# Patient Record
Sex: FEMALE | Race: WHITE | NOT HISPANIC OR LATINO | Employment: FULL TIME | URBAN - METROPOLITAN AREA
[De-identification: names, ages, dates, MRNs, and addresses within clinical notes are randomized per-mention and may not be internally consistent; named-entity substitution may affect disease eponyms.]

---

## 2020-01-23 PROBLEM — Z02.89 HEALTH EXAMINATION OF DEFINED SUBPOPULATION: Status: ACTIVE | Noted: 2020-01-23

## 2020-01-23 PROBLEM — Z87.891 HISTORY OF TOBACCO USE: Status: ACTIVE | Noted: 2020-01-23

## 2020-01-23 PROBLEM — E66.9 OBESITY: Status: ACTIVE | Noted: 2020-01-23

## 2020-01-23 PROBLEM — Z86.19 HISTORY OF CHICKEN POX: Status: ACTIVE | Noted: 2020-01-23

## 2020-06-11 ENCOUNTER — TRANSCRIBE ORDERS (OUTPATIENT)
Dept: ADMINISTRATIVE | Facility: HOSPITAL | Age: 55
End: 2020-06-11

## 2020-06-11 DIAGNOSIS — Z13.9 VISIT FOR SCREENING: Primary | ICD-10-CM

## 2020-08-18 ENCOUNTER — HOSPITAL ENCOUNTER (OUTPATIENT)
Dept: GENERAL RADIOLOGY | Facility: HOSPITAL | Age: 55
Discharge: HOME | End: 2020-08-18
Admitting: ORTHOPAEDIC SURGERY

## 2020-08-18 DIAGNOSIS — M25.562 LEFT KNEE PAIN: ICD-10-CM

## 2020-08-18 PROCEDURE — 73562 X-RAY EXAM OF KNEE 3: CPT

## 2020-09-15 ENCOUNTER — HOSPITAL ENCOUNTER (EMERGENCY)
Facility: HOSPITAL | Age: 55
Discharge: HOME | End: 2020-09-16
Admitting: EMERGENCY MEDICINE

## 2020-09-15 ENCOUNTER — APPOINTMENT (OUTPATIENT)
Dept: CT IMAGING | Facility: HOSPITAL | Age: 55
End: 2020-09-15

## 2020-09-15 DIAGNOSIS — S30.0XXA CONTUSION OF LOWER BACK, INITIAL ENCOUNTER: ICD-10-CM

## 2020-09-15 DIAGNOSIS — S70.01XA CONTUSION OF RIGHT HIP, INITIAL ENCOUNTER: ICD-10-CM
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
## 2020-09-15 DIAGNOSIS — R11.0 NAUSEA: ICD-10-CM

## 2020-09-15 LAB
ALBUMIN SERPL-MCNC: 4.6 G/DL (ref 3.5–5.2)
ALBUMIN/GLOB SERPL: 1.9 G/DL
ALP SERPL-CCNC: 65 U/L (ref 39–117)
ALT SERPL W P-5'-P-CCNC: 19 U/L (ref 1–33)
ANION GAP SERPL CALCULATED.3IONS-SCNC: 11 MMOL/L (ref 5–15)
AST SERPL-CCNC: 18 U/L (ref 1–32)
BASOPHILS # BLD AUTO: 0 10*3/MM3 (ref 0–0.2)
BASOPHILS NFR BLD AUTO: 0.4 % (ref 0–1.5)
BILIRUB SERPL-MCNC: 0.6 MG/DL (ref 0–1.2)
BUN SERPL-MCNC: 10 MG/DL (ref 6–20)
BUN SERPL-MCNC: ABNORMAL MG/DL
BUN/CREAT SERPL: ABNORMAL
CALCIUM SPEC-SCNC: 9.4 MG/DL (ref 8.6–10.5)
CHLORIDE SERPL-SCNC: 97 MMOL/L (ref 98–107)
CO2 SERPL-SCNC: 27 MMOL/L (ref 22–29)
CREAT SERPL-MCNC: 0.81 MG/DL (ref 0.57–1)
DEPRECATED RDW RBC AUTO: 39.8 FL (ref 37–54)
EOSINOPHIL # BLD AUTO: 0 10*3/MM3 (ref 0–0.4)
EOSINOPHIL NFR BLD AUTO: 0.4 % (ref 0.3–6.2)
ERYTHROCYTE [DISTWIDTH] IN BLOOD BY AUTOMATED COUNT: 12.9 % (ref 12.3–15.4)
GFR SERPL CREATININE-BSD FRML MDRD: 73 ML/MIN/1.73
GLOBULIN UR ELPH-MCNC: 2.4 GM/DL
GLUCOSE SERPL-MCNC: 142 MG/DL (ref 65–99)
HCT VFR BLD AUTO: 38.3 % (ref 34–46.6)
HGB BLD-MCNC: 13.4 G/DL (ref 12–15.9)
LYMPHOCYTES # BLD AUTO: 1 10*3/MM3 (ref 0.7–3.1)
LYMPHOCYTES NFR BLD AUTO: 14.6 % (ref 19.6–45.3)
MCH RBC QN AUTO: 31.2 PG (ref 26.6–33)
MCHC RBC AUTO-ENTMCNC: 35.1 G/DL (ref 31.5–35.7)
MCV RBC AUTO: 89 FL (ref 79–97)
MONOCYTES # BLD AUTO: 0.2 10*3/MM3 (ref 0.1–0.9)
MONOCYTES NFR BLD AUTO: 3.7 % (ref 5–12)
NEUTROPHILS NFR BLD AUTO: 5.5 10*3/MM3 (ref 1.7–7)
NEUTROPHILS NFR BLD AUTO: 80.9 % (ref 42.7–76)
NRBC BLD AUTO-RTO: 0.1 /100 WBC (ref 0–0.2)
PLATELET # BLD AUTO: 226 10*3/MM3 (ref 140–450)
PMV BLD AUTO: 6.9 FL (ref 6–12)
POTASSIUM SERPL-SCNC: 3.9 MMOL/L (ref 3.5–5.2)
PROT SERPL-MCNC: 7 G/DL (ref 6–8.5)
RBC # BLD AUTO: 4.3 10*6/MM3 (ref 3.77–5.28)
SODIUM SERPL-SCNC: 135 MMOL/L (ref 136–145)
WBC # BLD AUTO: 6.8 10*3/MM3 (ref 3.4–10.8)

## 2020-09-15 PROCEDURE — 96374 THER/PROPH/DIAG INJ IV PUSH: CPT

## 2020-09-15 PROCEDURE — 74177 CT ABD & PELVIS W/CONTRAST: CPT

## 2020-09-15 PROCEDURE — 0 IOPAMIDOL PER 1 ML: Performed by: NURSE PRACTITIONER

## 2020-09-15 PROCEDURE — 96375 TX/PRO/DX INJ NEW DRUG ADDON: CPT

## 2020-09-15 PROCEDURE — 25010000002 MORPHINE PER 10 MG: Performed by: NURSE PRACTITIONER

## 2020-09-15 PROCEDURE — 25010000002 ONDANSETRON PER 1 MG: Performed by: NURSE PRACTITIONER

## 2020-09-15 PROCEDURE — 80053 COMPREHEN METABOLIC PANEL: CPT | Performed by: NURSE PRACTITIONER

## 2020-09-15 PROCEDURE — 99283 EMERGENCY DEPT VISIT LOW MDM: CPT

## 2020-09-15 PROCEDURE — 85025 COMPLETE CBC W/AUTO DIFF WBC: CPT | Performed by: NURSE PRACTITIONER

## 2020-09-15 RX ORDER — ONDANSETRON 2 MG/ML
4 INJECTION INTRAMUSCULAR; INTRAVENOUS ONCE
Status: COMPLETED | OUTPATIENT
Start: 2020-09-15 | End: 2020-09-15

## 2020-09-15 RX ORDER — MORPHINE SULFATE 4 MG/ML
4 INJECTION, SOLUTION INTRAMUSCULAR; INTRAVENOUS ONCE
Status: COMPLETED | OUTPATIENT
Start: 2020-09-15 | End: 2020-09-15

## 2020-09-15 RX ORDER — SODIUM CHLORIDE 0.9 % (FLUSH) 0.9 %
10 SYRINGE (ML) INJECTION AS NEEDED
Status: DISCONTINUED | OUTPATIENT
Start: 2020-09-15 | End: 2020-09-16 | Stop reason: HOSPADM

## 2020-09-15 RX ADMIN — MORPHINE SULFATE 4 MG: 4 INJECTION INTRAVENOUS at 22:43

## 2020-09-15 RX ADMIN — IOPAMIDOL 100 ML: 755 INJECTION, SOLUTION INTRAVENOUS at 23:32

## 2020-09-15 RX ADMIN — ONDANSETRON 4 MG: 2 INJECTION INTRAMUSCULAR; INTRAVENOUS at 22:43

## 2020-09-16 VITALS
BODY MASS INDEX: 34.44 KG/M2 | RESPIRATION RATE: 16 BRPM | HEIGHT: 64 IN | SYSTOLIC BLOOD PRESSURE: 101 MMHG | TEMPERATURE: 97.8 F | WEIGHT: 201.72 LBS | HEART RATE: 64 BPM | DIASTOLIC BLOOD PRESSURE: 56 MMHG | OXYGEN SATURATION: 95 %

## 2020-09-16 PROCEDURE — 25010000002 ONDANSETRON PER 1 MG: Performed by: NURSE PRACTITIONER

## 2020-09-16 PROCEDURE — 96375 TX/PRO/DX INJ NEW DRUG ADDON: CPT

## 2020-09-16 PROCEDURE — 25010000002 HYDROMORPHONE PER 4 MG: Performed by: NURSE PRACTITIONER

## 2020-09-16 PROCEDURE — 96376 TX/PRO/DX INJ SAME DRUG ADON: CPT

## 2020-09-16 RX ORDER — HYDROCODONE BITARTRATE AND ACETAMINOPHEN 7.5; 325 MG/1; MG/1
1-2 TABLET ORAL EVERY 6 HOURS PRN
Qty: 12 TABLET | Refills: 0 | Status: SHIPPED | OUTPATIENT
Start: 2020-09-16

## 2020-09-16 RX ORDER — ONDANSETRON 4 MG/1
4 TABLET, ORALLY DISINTEGRATING ORAL EVERY 8 HOURS PRN
Qty: 8 TABLET | Refills: 0 | Status: SHIPPED | OUTPATIENT
Start: 2020-09-16

## 2020-09-16 RX ORDER — METHOCARBAMOL 500 MG/1
500 TABLET, FILM COATED ORAL 4 TIMES DAILY
Qty: 12 TABLET | Refills: 0 | Status: SHIPPED | OUTPATIENT
Start: 2020-09-16

## 2020-09-16 RX ORDER — HYDROMORPHONE HCL 110MG/55ML
0.5 PATIENT CONTROLLED ANALGESIA SYRINGE INTRAVENOUS ONCE
Status: COMPLETED | OUTPATIENT
Start: 2020-09-16 | End: 2020-09-16

## 2020-09-16 RX ORDER — ONDANSETRON 2 MG/ML
4 INJECTION INTRAMUSCULAR; INTRAVENOUS ONCE
Status: COMPLETED | OUTPATIENT
Start: 2020-09-16 | End: 2020-09-16

## 2020-09-16 RX ADMIN — ONDANSETRON 4 MG: 2 INJECTION INTRAMUSCULAR; INTRAVENOUS at 00:28

## 2020-09-16 RX ADMIN — HYDROMORPHONE HYDROCHLORIDE 0.5 MG: 2 INJECTION, SOLUTION INTRAMUSCULAR; INTRAVENOUS; SUBCUTANEOUS at 00:28

## 2020-09-16 NOTE — DISCHARGE INSTRUCTIONS
Take medication as prescribed.  Rest.  Ice to sore areas 20 minutes at a time several times a day for the next 2 days.  No heavy lifting bending pulling stooping next 3 to 5 days.  Follow-up with your primary care provider as needed.  Return for new or worsening symptoms.

## 2020-09-16 NOTE — ED PROVIDER NOTES
Subjective   Patient is a 55-year-old white female with no significant medical history presents today with complaints of a fall.  She states she went to sit down in a rolling chair when it slipped out from under her and she landed on her right hip and flank.  She denies striking her head or having LOC.  She states she was able to get up after the fall and has been ambulatory since then.  She complains of pain in the right flank and posterior hip that is worse with weightbearing and movement.  She states the pain is causing her to be nauseous anytime she moves.  She reports several episodes of vomiting.  She reports mild pain at rest.  She denies any numbness tingling or weakness in either lower extremity.  She denies any bowel or bladder dysfunction or saddle anesthesia.  She denies any abdominal pain.  She denies blood thinner use.          Review of Systems   Respiratory: Negative for shortness of breath.    Cardiovascular: Negative for chest pain.   Gastrointestinal: Positive for nausea and vomiting. Negative for abdominal pain.   Genitourinary: Positive for flank pain. Negative for difficulty urinating and hematuria.   Musculoskeletal:        Right posterior hip pain   Neurological: Negative for dizziness, weakness, numbness and headaches.       No past medical history on file.    No Known Allergies    Past Surgical History:   Procedure Laterality Date   • HYSTERECTOMY         No family history on file.    Social History     Socioeconomic History   • Marital status:      Spouse name: Not on file   • Number of children: Not on file   • Years of education: Not on file   • Highest education level: Not on file   Tobacco Use   • Smoking status: Never Smoker   • Smokeless tobacco: Never Used   Substance and Sexual Activity   • Alcohol use: No     Frequency: Never   • Drug use: No   • Sexual activity: Defer           Objective   Physical Exam  Vital signs and triage nurse note reviewed.  Constitutional: Awake,  alert; well-developed and well-nourished. No acute distress is noted.  HEENT: Normocephalic, atraumatic; pupils are PERRL with intact EOM  Neck: Supple, full range of motion without pain; no cervical lymphadenopathy. Normal phonation.  Cardiovascular: Regular rate and rhythm, normal S1-S2.  No murmur noted.  Pulmonary: Respiratory effort regular nonlabored, breath sounds clear to auscultation all fields.  Abdomen: Soft, nontender, nondistended with normoactive bowel sounds; no rebound or guarding.  Tenderness over the right flank and CVA.  There is no ecchymosis.  No crepitus.  There is tenderness over the right posterior hip without erythema ecchymosis or edema.  There is good range of motion however this does elicit pain.  Musculoskeletal: Independent range of motion of all extremities with no palpable tenderness or edema.  Neuro: Alert oriented x3, speech is clear and appropriate, GCS 15.    Skin: Flesh tone, warm, dry, intact; no erythematous or petechial rash or lesion.      Procedures           ED Course      Labs Reviewed   COMPREHENSIVE METABOLIC PANEL - Abnormal; Notable for the following components:       Result Value    Glucose 142 (*)     Sodium 135 (*)     Chloride 97 (*)     All other components within normal limits    Narrative:     GFR Normal >60  Chronic Kidney Disease <60  Kidney Failure <15     CBC WITH AUTO DIFFERENTIAL - Abnormal; Notable for the following components:    Neutrophil % 80.9 (*)     Lymphocyte % 14.6 (*)     Monocyte % 3.7 (*)     All other components within normal limits   BUN - Normal   URINALYSIS W/ CULTURE IF INDICATED   CBC AND DIFFERENTIAL    Narrative:     The following orders were created for panel order CBC & Differential.  Procedure                               Abnormality         Status                     ---------                               -----------         ------                     CBC Auto Differential[[id number]]        Abnormal            Final result                  Please view results for these tests on the individual orders.     Ct Abdomen Pelvis With Contrast    Result Date: 9/15/2020  1. No evidence of acute disease in the abdomen or pelvis. 2. Trace simple free fluid in the dependent pelvis, nonspecific and potentially physiologic. 3. Status post cholecystectomy and hysterectomy. Electronically signed by:  Dannie Powers M.D.  9/15/2020 9:51 PM    Medications   sodium chloride 0.9 % flush 10 mL (has no administration in time range)   HYDROmorphone (DILAUDID) injection 0.5 mg (has no administration in time range)   ondansetron (ZOFRAN) injection 4 mg (has no administration in time range)   Morphine sulfate (PF) injection 4 mg (4 mg Intravenous Given 9/15/20 2243)   ondansetron (ZOFRAN) injection 4 mg (4 mg Intravenous Given 9/15/20 2243)   iopamidol (ISOVUE-370) 76 % injection 100 mL (100 mL Intravenous Given 9/15/20 1482)                                          MDM  Number of Diagnoses or Management Options  Contusion of lower back, initial encounter:   Contusion of right hip, initial encounter:   Fall, initial encounter:   Nausea:   Diagnosis management comments: Comorbidities: None  Differentials: Fracture, contusion, intra-abdominal injury;this list is not all inclusive and does not constitute the entirety of considered causes  Discussion with provider:  Radiology interpretation: X-rays reviewed by me and interpreted by radiologist: As above  Lab interpretation: Labs viewed by me significant for: As above    Patient had IV established.  She had labs and CT obtained.  She was given morphine and Zofran for pain and nausea.    Patient was given a dose of Dilaudid persistent pain.  She has had no vomiting since arrival to ED.  She is well-appearing, in no acute distress and has stable vital signs.    Diagnosis and treatment plan discussed with patient.  Patient agreeable to plan.   I discussed findings with patient who voices understanding of discharge  instructions, signs and symptoms requiring return to ED; discharged improved and in stable condition with follow up for re-evaluation.  Prescriptions for Norco, Flexeril, Zofran.  Inspect reviewed.       Amount and/or Complexity of Data Reviewed  Clinical lab tests: ordered and reviewed  Tests in the radiology section of CPT®: ordered and reviewed    Patient Progress  Patient progress: stable      Final diagnoses:   Fall, initial encounter   Contusion of right hip, initial encounter   Nausea   Contusion of lower back, initial encounter            Guillermina Boucher, YNES  09/16/20 0017

## 2020-09-18 ENCOUNTER — APPOINTMENT (OUTPATIENT)
Dept: CARDIOLOGY | Facility: HOSPITAL | Age: 55
End: 2020-09-18

## 2020-09-18 ENCOUNTER — APPOINTMENT (OUTPATIENT)
Dept: CT IMAGING | Facility: HOSPITAL | Age: 55
End: 2020-09-18

## 2020-10-07 ENCOUNTER — TRANSCRIBE ORDERS (OUTPATIENT)
Dept: PHYSICAL THERAPY | Facility: CLINIC | Age: 55
End: 2020-10-07

## 2020-10-07 DIAGNOSIS — M25.551 RIGHT HIP PAIN: Primary | ICD-10-CM

## 2020-10-19 ENCOUNTER — TREATMENT (OUTPATIENT)
Dept: PHYSICAL THERAPY | Facility: CLINIC | Age: 55
End: 2020-10-19

## 2020-10-19 DIAGNOSIS — M25.551 RIGHT HIP PAIN: Primary | ICD-10-CM

## 2020-10-19 PROCEDURE — 97161 PT EVAL LOW COMPLEX 20 MIN: CPT | Performed by: PHYSICAL THERAPIST

## 2020-10-19 PROCEDURE — 97035 APP MDLTY 1+ULTRASOUND EA 15: CPT | Performed by: PHYSICAL THERAPIST

## 2020-10-19 PROCEDURE — 97110 THERAPEUTIC EXERCISES: CPT | Performed by: PHYSICAL THERAPIST

## 2020-10-19 PROCEDURE — 97140 MANUAL THERAPY 1/> REGIONS: CPT | Performed by: PHYSICAL THERAPIST

## 2020-10-19 NOTE — PROGRESS NOTES
Physical Therapy Initial Evaluation and Plan of Care    Patient: Alicia Melo   : 1965  Diagnosis/ICD-10 Code:  Right hip pain [M25.551]  Referring practitioner: YNES Morgan  Date of Initial Visit: 10/19/2020  Today's Date: 10/19/2020  Patient seen for 1 sessions             Subjective Evaluation    History of Present Illness  Mechanism of injury: Patient is a 55 y.o. WF who presents with acute R hip pain after falling onto R hip on tile floor when a chair rolled out from under her at a car [employer].  She began vomiting repeatedly immediately after it happened.  X-rays showed possibly an old lumbar fracture but she doesn't remember any injury though she has a history of R sided back pain and sciatica.  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  Pain increases with prolonged sitting.  Pain decreases with movement, walking up to 3 miles, lying supine, Tylenol PM at night.  Personal goals:  Get back to walking 5-6 miles per day 5 days per week, strengthen R leg, R hip flexion.    Patient Goals  Patient goals for therapy: decreased pain, increased motion, increased strength, independence with ADLs/IADLs and improved balance             Objective Oxford hip score indicates 27% impairment with limitations in donning socks, prolonged sitting, pain.  Hip AROM:  Discomfort at end range hip flexion, limited IR and ER with discomfort.  Hamstring moderately tight B.  Tenderness R piriformis, R L4-5 PSM as well as pressure on facets and rotation.  SLS = 3 sec L, 10 sec R.  Repeated sit to stand = 12 reps with hands.  MMT 4/5 R hip flexion.      Assessment & Plan     Assessment  Impairments: abnormal muscle tone, abnormal or restricted ROM, activity intolerance, impaired balance, impaired physical strength, lacks appropriate home exercise program and pain with function  Prognosis: good  Functional Limitations: walking,  uncomfortable because of pain and sitting  Goals  Plan Goals: Patient independent with HEP in 2 weeks  Tolerate walking 5 miles in 2 weeks  Able to don R sock easily in 2 weeks  Improve function as evidenced by Oxford hip score of 20% or less by discharge (27% impairment initially)  Able to strengthen R leg to allow SLS to 30 sec by discharge  Perform 10 reps of repeated sit to stand without arms in 30 seconds by discharge       Plan  Therapy options: will be seen for skilled physical therapy services  Planned modality interventions: ultrasound, electrical stimulation/Russian stimulation and thermotherapy (hydrocollator packs)  Other planned modality interventions: physical modalities as needed, TDN  Planned therapy interventions: balance/weight-bearing training, flexibility, gait training, home exercise program, manual therapy, neuromuscular re-education, postural training, strengthening, stretching and therapeutic activities  Treatment plan discussed with: patient  Plan details: Plan to continue 2x's per week up to 12 visits if needed.        Timed:         Manual Therapy:    15     mins  01685;     Therapeutic Exercise:    15     mins  93254;     Neuromuscular Adrienne:        mins  26704;    Therapeutic Activity:          mins  94278;     Gait Training:           mins  78369;     Ultrasound:     10     mins  72773;    Ionto                                   mins   01102  Self-care  ____ mins 94574    Un-Timed:  Electrical Stimulation:         mins  17605 ( );  Dry Needling          mins self-pay  Traction          mins 90847  Low Eval     15     Mins  79103  Mod Eval          Mins  29017  High Eval                            Mins  73465  Canal repositioning _____ mins  96684        Timed Treatment:   40   mins   Total Treatment:     55   mins    PT SIGNATURE: Robin A Sprigler, PT   DATE TREATMENT INITIATED: 10/19/2020    Initial Certification  Certification Period: 1/17/2021  I certify that the therapy  services are furnished while this patient is under my care.  The services outlined above are required by this patient, and will be reviewed every 90 days.     PHYSICIAN: Opal Martinez, APRVIMAL  ________________________________________________________________________________________________    DATE: _____________________________________________________________________________________________________________________________________  Please sign and return via fax to [phone].. Thank you, [facility].

## 2020-10-26 ENCOUNTER — TREATMENT (OUTPATIENT)
Dept: PHYSICAL THERAPY | Facility: CLINIC | Age: 55
End: 2020-10-26

## 2020-10-26 DIAGNOSIS — M25.551 RIGHT HIP PAIN: Primary | ICD-10-CM

## 2020-10-26 PROCEDURE — 97035 APP MDLTY 1+ULTRASOUND EA 15: CPT | Performed by: PHYSICAL THERAPIST

## 2020-10-26 PROCEDURE — 97140 MANUAL THERAPY 1/> REGIONS: CPT | Performed by: PHYSICAL THERAPIST

## 2020-10-26 PROCEDURE — 97110 THERAPEUTIC EXERCISES: CPT | Performed by: PHYSICAL THERAPIST

## 2020-10-26 NOTE — PROGRESS NOTES
Physical Therapy Daily Progress Note      Patient: Alicia Melo   : 1965  Diagnosis/ICD-10 Code:  Right hip pain [M25.551]   Problems Addressed this Visit     None      Visit Diagnoses     Right hip pain    -  Primary      Diagnoses       Codes Comments    Right hip pain    -  Primary ICD-10-CM: M25.551  ICD-9-CM: 719.45          Referring practitioner: YNES Morgan  Date of Initial Visit: Type: THERAPY  Noted: 10/19/2020  Today's Date: 10/26/2020    VISIT#: 2    Subjective Patient reports feeling about 90% better. Reports sit to stands does tend to irritate back though and held on doing them at home.       Objective Added hip abd/add hookyling, seated HS stretch, Nustep, leg press.     See Exercise, Manual, and Modality Logs for complete treatment.     Assessment/Plan Patient tolerated progressed therapeutic exercise well with no reports of increased symptoms. Patient provided proper return demonstration with reviewed HEP.     Progress per Plan of Care         Timed:         Manual Therapy:    15     mins  95324;     Therapeutic Exercise:    20     mins  11661;     Neuromuscular Adrienne:        mins  91315;    Therapeutic Activity:          mins  48368;     Gait Training:           mins  82998;     Ultrasound:     10     mins  59291;    Ionto                                   mins   14523  Canalith Repos                   mins  83130    Un-Timed:  Electrical Stimulation:         mins  65957 ( );  Dry Needling          mins self-pay  Traction          mins 36098    Timed Treatment:   45   mins   Total Treatment:     45   mins    Ladarius Rogers PTA  Physical Therapist

## 2020-10-28 ENCOUNTER — TREATMENT (OUTPATIENT)
Dept: PHYSICAL THERAPY | Facility: CLINIC | Age: 55
End: 2020-10-28

## 2020-10-28 DIAGNOSIS — M25.551 RIGHT HIP PAIN: Primary | ICD-10-CM

## 2020-10-28 PROCEDURE — 97035 APP MDLTY 1+ULTRASOUND EA 15: CPT | Performed by: PHYSICAL THERAPIST

## 2020-10-28 PROCEDURE — 97110 THERAPEUTIC EXERCISES: CPT | Performed by: PHYSICAL THERAPIST

## 2020-10-28 PROCEDURE — 97140 MANUAL THERAPY 1/> REGIONS: CPT | Performed by: PHYSICAL THERAPIST

## 2020-10-28 NOTE — PROGRESS NOTES
Physical Therapy Daily Progress Note      Patient: Alicia Melo   : 1965  Diagnosis/ICD-10 Code:  Right hip pain [M25.551]   Problems Addressed this Visit     None      Visit Diagnoses     Right hip pain    -  Primary      Diagnoses       Codes Comments    Right hip pain    -  Primary ICD-10-CM: M25.551  ICD-9-CM: 719.45          Referring practitioner: YNES Morgan  Date of Initial Visit: Type: THERAPY  Noted: 10/19/2020  Today's Date: 10/28/2020    VISIT#: 3    Subjective Patient reports feeling much better since last visit and is having little to no pain to report at this time.       Objective Added prone hip extensions.     See Exercise, Manual, and Modality Logs for complete treatment.     Assessment/Plan Patient tolerated progressed therapeutic exercise well with no reports of increased pain. Patient will continue to benefit from improved core strength for improved stability with transitional movement.     Progress per Plan of Care         Timed:         Manual Therapy:    15     mins  21379;     Therapeutic Exercise:    20     mins  53431;     Neuromuscular Adrienne:        mins  63835;    Therapeutic Activity:          mins  70747;     Gait Training:           mins  96943;     Ultrasound:    10      mins  26370;    Ionto                                   mins   82246  Canalith Repos                   mins  59832    Un-Timed:  Electrical Stimulation:         mins  24555 (MC );  Dry Needling          mins self-pay  Traction          mins 57119    Timed Treatment:   45   mins   Total Treatment:     45   mins    Ladarius Rogers PTA  Physical Therapist

## 2020-11-03 ENCOUNTER — TREATMENT (OUTPATIENT)
Dept: PHYSICAL THERAPY | Facility: CLINIC | Age: 55
End: 2020-11-03

## 2020-11-03 DIAGNOSIS — M25.551 RIGHT HIP PAIN: Primary | ICD-10-CM

## 2020-11-03 PROCEDURE — 97140 MANUAL THERAPY 1/> REGIONS: CPT | Performed by: PHYSICAL THERAPIST

## 2020-11-03 PROCEDURE — 97110 THERAPEUTIC EXERCISES: CPT | Performed by: PHYSICAL THERAPIST

## 2020-11-03 PROCEDURE — 97035 APP MDLTY 1+ULTRASOUND EA 15: CPT | Performed by: PHYSICAL THERAPIST

## 2020-11-03 NOTE — PROGRESS NOTES
Physical Therapy Daily Progress Note      Patient: Alicia Melo   : 1965  Diagnosis/ICD-10 Code:  Right hip pain [M25.551]   Problems Addressed this Visit     None      Visit Diagnoses     Right hip pain    -  Primary      Diagnoses       Codes Comments    Right hip pain    -  Primary ICD-10-CM: M25.551  ICD-9-CM: 719.45          Referring practitioner: YNES Morgan  Date of Initial Visit: Type: THERAPY  Noted: 10/19/2020  Today's Date: 11/3/2020    VISIT#: 4    Subjective Patient reports feeling a little soreness after walking for a long distance yesterday but overall pleased with progress since beginning PT with decreased intensity and frequency of pain.       Objective Added seated elliptical reverse.     See Exercise, Manual, and Modality Logs for complete treatment.     Assessment/Plan  Patient tolerated progressed therapeutic exercise well with no reports of increased symptoms. Patient making gradual gains towards goals at this time.   Plan Goals: Patient independent with HEP in 2 weeks  Tolerate walking 5 miles in 2 weeks Progressing  Able to don R sock easily in 2 weeks MEt  Improve function as evidenced by Oxford hip score of 20% or less by discharge (27% impairment initially)  Able to strengthen R leg to allow SLS to 30 sec by discharge Progressing  Perform 10 reps of repeated sit to stand without arms in 30 seconds by discharge  MET  Progress per Plan of Care         Timed:         Manual Therapy:    15     mins  49294;     Therapeutic Exercise:    20     mins  60402;     Neuromuscular Adrienne:        mins  60397;    Therapeutic Activity:          mins  60922;     Gait Training:           mins  52924;     Ultrasound:     10     mins  57433;    Ionto                                   mins   21017  Canalith Repos                   mins  60982    Un-Timed:  Electrical Stimulation:         mins  32069 ( );  Dry Needling          mins self-pay  Traction          mins 22721    Timed  Treatment:   45   mins   Total Treatment:     45   mins    Ladarius Rogers PTA  Physical Therapist

## 2020-11-12 ENCOUNTER — TREATMENT (OUTPATIENT)
Dept: PHYSICAL THERAPY | Facility: CLINIC | Age: 55
End: 2020-11-12

## 2020-11-12 DIAGNOSIS — M25.551 RIGHT HIP PAIN: Primary | ICD-10-CM

## 2020-11-12 PROCEDURE — 97110 THERAPEUTIC EXERCISES: CPT | Performed by: PHYSICAL THERAPIST

## 2020-11-12 PROCEDURE — 97035 APP MDLTY 1+ULTRASOUND EA 15: CPT | Performed by: PHYSICAL THERAPIST

## 2020-11-12 PROCEDURE — 97140 MANUAL THERAPY 1/> REGIONS: CPT | Performed by: PHYSICAL THERAPIST

## 2020-11-12 NOTE — PROGRESS NOTES
"Physical Therapy Daily Progress Note      Patient: Alicia Melo   : 1965  Diagnosis/ICD-10 Code:  Right hip pain [M25.551]   Problems Addressed this Visit     None      Visit Diagnoses     Right hip pain    -  Primary      Diagnoses       Codes Comments    Right hip pain    -  Primary ICD-10-CM: M25.551  ICD-9-CM: 719.45          Referring practitioner: YNES Morgan  Date of Initial Visit: Type: THERAPY  Noted: 10/19/2020  Today's Date: 2020    VISIT#: 5    Subjective Patient reports having some mild aching in R low back/buttock area, but overall better since beginning PT.       Objective Added 6\" reverse steps      See Exercise, Manual, and Modality Logs for complete treatment.     Assessment/Plan Patient tolerated progressed therapeutic exercise well with no reports of increased symptoms. Patient demonstrating improved transitional movement in clinic with no antalgic gait noted. Patient continues to make gradual gains towards goals at this time.   Plan Goals: Patient independent with HEP in 2 weeks   Tolerate walking 5 miles in 2 weeks Progressing  Able to don R sock easily in 2 weeks MET  Improve function as evidenced by Oxford hip score of 20% or less by discharge (27% impairment initially)  Able to strengthen R leg to allow SLS to 30 sec by discharge Progressing  Perform 10 reps of repeated sit to stand without arms in 30 seconds by discharge  MET  Progress per Plan of Care         Timed:         Manual Therapy:    15     mins  09675;     Therapeutic Exercise:    20     mins  79101;     Neuromuscular Adrienne:        mins  51166;    Therapeutic Activity:          mins  91432;     Gait Training:           mins  23680;     Ultrasound:     10     mins  66678;    Ionto                                   mins   89749  Canalith Repos                   mins  50963    Un-Timed:  Electrical Stimulation:         mins  11225 ( );  Dry Needling          mins self-pay  Traction          mins " 22917    Timed Treatment:   45   mins   Total Treatment:     45   mins    Ladarius Rogers, PTA  Physical Therapist

## 2020-12-21 ENCOUNTER — DOCUMENTATION (OUTPATIENT)
Dept: PHYSICAL THERAPY | Facility: CLINIC | Age: 55
End: 2020-12-21

## 2020-12-21 NOTE — PROGRESS NOTES
"Discharge Summary  Discharge Summary from Physical Therapy Report          Goals: Partially Met    Discharge Plan: Continue with current home exercise program as instructed    Comments  Last seen 11/12/2020     VISIT#: 5     Subjective Patient reports having some mild aching in R low back/buttock area, but overall better since beginning PT.      Objective Added 6\" reverse steps                       See Exercise, Manual, and Modality Logs for complete treatment.      Assessment/Plan     Patient tolerated progressed therapeutic exercise well with no reports of increased symptoms. Patient demonstrating improved transitional movement in clinic with no antalgic gait noted. Patient continues to make gradual gains towards goals at this time.     Plan Goals: Patient independent with HEP in 2 weeks   Tolerate walking 5 miles in 2 weeks Progressing  Able to don R sock easily in 2 weeks MET  Improve function as evidenced by Oxford hip score of 20% or less by discharge (27% impairment initially) - unable to assess  Able to strengthen R leg to allow SLS to 30 sec by discharge Progressing  Perform 10 reps of repeated sit to stand without arms in 30 seconds by discharge  MET    Date of Discharge 12/21/20        Robin A Sprigler, PT  Physical Therapist                      "

## 2021-08-10 ENCOUNTER — HOSPITAL ENCOUNTER (OUTPATIENT)
Dept: GENERAL RADIOLOGY | Facility: HOSPITAL | Age: 56
Discharge: HOME | End: 2021-08-10
Admitting: ORTHOPAEDIC SURGERY

## 2021-08-10 DIAGNOSIS — M25.562 LEFT KNEE PAIN, UNSPECIFIED CHRONICITY: ICD-10-CM

## 2021-08-10 PROCEDURE — 73560 X-RAY EXAM OF KNEE 1 OR 2: CPT

## 2021-09-06 PROCEDURE — U0004 COV-19 TEST NON-CDC HGH THRU: HCPCS | Performed by: PAIN MEDICINE

## 2021-12-02 ENCOUNTER — TREATMENT (OUTPATIENT)
Dept: PHYSICAL THERAPY | Facility: CLINIC | Age: 56
End: 2021-12-02

## 2021-12-02 DIAGNOSIS — M25.562 CHRONIC PAIN OF LEFT KNEE: Primary | ICD-10-CM

## 2021-12-02 DIAGNOSIS — G89.29 CHRONIC PAIN OF LEFT KNEE: Primary | ICD-10-CM

## 2021-12-02 DIAGNOSIS — Z98.890 S/P MEDIAL MENISCAL REPAIR: ICD-10-CM

## 2021-12-02 PROCEDURE — 97110 THERAPEUTIC EXERCISES: CPT | Performed by: PHYSICAL THERAPIST

## 2021-12-02 PROCEDURE — 97161 PT EVAL LOW COMPLEX 20 MIN: CPT | Performed by: PHYSICAL THERAPIST

## 2021-12-02 PROCEDURE — 97140 MANUAL THERAPY 1/> REGIONS: CPT | Performed by: PHYSICAL THERAPIST

## 2021-12-02 NOTE — PROGRESS NOTES
Physical Therapy Initial Evaluation and Plan of Care    Patient: Alicia Melo   : 1965  Diagnosis/ICD-10 Code:  Chronic pain of left knee [M25.562, G89.29]  Referring practitioner: Jl Bah MD  Date of Initial Visit: 2021  Today's Date: 2021  Patient seen for 1 sessions             Subjective Evaluation    History of Present Illness  Mechanism of injury: Patient presents 6 weeks s/p L knee med men repair and chronic L knee pain.  She reports pain has decreased 50% since surgery, just left with constant ache that worsens with steps and kneeling.   Patient enjoys hiking for recreation and has not been able to return to that.  Personal goals:  Build strength, return to hiking, able to kneel at [employer] and cleaning shower.  No scheduled appointment for return to Dr. Bah but is considering gel injection.  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      Patient Goals  Patient goals for therapy: decreased pain, increased strength and return to sport/leisure activities             Objective Oxford knee score indicates 44% impairment with limitations in kneeling, stairs, recreation.  AROM is equal B knees with some lateral joint discomfort L knee at end range flexion and extension.  Min/mod tight hams B, min tight hip flexors B.  MMT:  4/5 B LEs throughout.  Poor VMO quad tone L.  Ambulates independently without limp.      Assessment & Plan     Assessment  Impairments: impaired balance, impaired physical strength, lacks appropriate home exercise program and pain with function  Functional Limitations: sleeping, walking and uncomfortable because of pain  Goals  Plan Goals: Patient independent with HEP in 2 weeks  Tolerate 10 min standing Elliptical in 2 weeks  Able to return to work in 2 weeks  Improve function as evidenced by Oxford knee score of 20% or less by discharge (44% impairment initially)  Able to return to work by discharge  Perform 10 reps of repeated sit to stand without arms in 30  seconds by discharge   Able to SLS 30 sec B by discharge    Plan  Therapy options: will be seen for skilled therapy services  Other planned modality interventions: physical modalities as needed  Planned therapy interventions: manual therapy, flexibility, functional ROM exercises, home exercise program, balance/weight-bearing training, neuromuscular re-education, postural training, strengthening, stretching and therapeutic activities  Frequency: 2x week  Duration in weeks: 12  Treatment plan discussed with: patient  Plan details: Plan to continue PT up to 12 weeks if needed.        Timed:         Manual Therapy:    15     mins  49964;     Therapeutic Exercise:    15     mins  73852;     Neuromuscular Adrienne:        mins  62232;    Therapeutic Activity:          mins  99109;     Gait Training:           mins  69559;     Ultrasound:          mins  56265;    Ionto                                   mins   00294  Self-care  ____ mins 35878    Un-Timed:  Electrical Stimulation:         mins  49166 ( );  Dry Needling          mins self-pay  Traction          mins 27011  Low Eval     30     Mins  32545  Mod Eval          Mins  46951  High Eval                            Mins  00569  Canal repositioning _____ mins  02981        Timed Treatment:   30   mins   Total Treatment:     60   mins    PT SIGNATURE: Robin A Sprigler, PT   DATE TREATMENT INITIATED: 12/2/2021    Initial Certification  Certification Period: 3/2/2022  I certify that the therapy services are furnished while this patient is under my care.  The services outlined above are required by this patient, and will be reviewed every 90 days.     PHYSICIAN: Jl Bah MD  __________________________________________________________________________________________________    DATE: _______________________________________________________________________________________________________________________________    Please sign and return via fax to [phone]. Thank  you, [facility].

## 2021-12-06 ENCOUNTER — TREATMENT (OUTPATIENT)
Dept: PHYSICAL THERAPY | Facility: CLINIC | Age: 56
End: 2021-12-06

## 2021-12-06 DIAGNOSIS — G89.29 CHRONIC PAIN OF LEFT KNEE: Primary | ICD-10-CM

## 2021-12-06 DIAGNOSIS — Z98.890 S/P MEDIAL MENISCAL REPAIR: ICD-10-CM

## 2021-12-06 DIAGNOSIS — M25.562 CHRONIC PAIN OF LEFT KNEE: Primary | ICD-10-CM

## 2021-12-06 DIAGNOSIS — M25.551 RIGHT HIP PAIN: ICD-10-CM

## 2021-12-06 PROCEDURE — 97110 THERAPEUTIC EXERCISES: CPT | Performed by: PHYSICAL THERAPIST

## 2021-12-06 NOTE — PROGRESS NOTES
Physical Therapy Daily Progress Note      Patient: Alicia Melo   : 1965  Diagnosis/ICD-10 Code:  Chronic pain of left knee [M25.562, G89.29]   Problems Addressed this Visit     None      Visit Diagnoses     Chronic pain of left knee    -  Primary    S/P medial meniscal repair        Right hip pain          Diagnoses       Codes Comments    Chronic pain of left knee    -  Primary ICD-10-CM: M25.562, G89.29  ICD-9-CM: 719.46, 338.29     S/P medial meniscal repair     ICD-10-CM: Z98.890  ICD-9-CM: V45.89     Right hip pain     ICD-10-CM: M25.551  ICD-9-CM: 719.45          Referring practitioner: Jl Bah MD  Date of Initial Visit: Type: THERAPY  Noted: 2021  Today's Date: 2021    VISIT#: 2    Subjective Patient reports no new changes with continued pain in medial L knee.       Objective added 3 way hip, SLS, leg press, step ups    See Exercise, Manual, and Modality Logs for complete treatment.     Assessment/Plan Patient tolerated progressed therapeutic exercise well, with instruction to work in pain free ranges. Patient with mild L quad tightness and responded well to roxi test stretch. Patient will continue to benefit from improved strengthening and quad mobility for improved tolerance to daily functional mobility.     Progress per Plan of Care         Timed:         Manual Therapy:    5     mins  80551;     Therapeutic Exercise:    30     mins  84498;     Neuromuscular Adrienne:        mins  34204;    Therapeutic Activity:          mins  10618;     Gait Training:           mins  83797;     Ultrasound:          mins  65888;    Ionto                                   mins   10078  Self Care                    _____  mins   40428  Canalith Repos                   mins  40618    Un-Timed:  Electrical Stimulation:         mins  08851 ( );  Dry Needling         mins self-pay  Traction          mins 21349    Timed Treatment:   35   mins   Total Treatment:     35   mins    Ladarius Rogers  PTA  [place] License [id number]  Physical Therapist Assistant

## 2021-12-09 ENCOUNTER — TREATMENT (OUTPATIENT)
Dept: PHYSICAL THERAPY | Facility: CLINIC | Age: 56
End: 2021-12-09

## 2021-12-09 DIAGNOSIS — Z98.890 S/P MEDIAL MENISCAL REPAIR: ICD-10-CM

## 2021-12-09 DIAGNOSIS — G89.29 CHRONIC PAIN OF LEFT KNEE: Primary | ICD-10-CM

## 2021-12-09 DIAGNOSIS — M25.562 CHRONIC PAIN OF LEFT KNEE: Primary | ICD-10-CM

## 2021-12-09 PROCEDURE — 97530 THERAPEUTIC ACTIVITIES: CPT | Performed by: PHYSICAL THERAPIST

## 2021-12-09 PROCEDURE — 97110 THERAPEUTIC EXERCISES: CPT | Performed by: PHYSICAL THERAPIST

## 2021-12-09 NOTE — PROGRESS NOTES
Physical Therapy Daily Progress Note    Patient: Alicia Melo   : 1965  Diagnosis/ICD-10 Code:  Chronic pain of left knee [M25.562, G89.29]  Referring practitioner: Jl Bah MD  Date of Initial Visit: Type: THERAPY  Noted: 2021  Today's Date: 2021  Patient seen for 3 sessions             Subjective Patient went to the  this morning and walked.  She starts her 3 injections in L knee next week. Did 20 min on Nustep at  yesterday.  She has not missed any work.  Her friend asked her to do yoga with her and she said yes but is concerned she doesn't have the balance.  Going to [place] [facility] after 3rd injection.    Objective   See Exercise, Manual, and Modality Logs for complete treatment. SLS 5 sec L, 17 sec R.  Attempted half kneel but was painful in knee cap B.  Stairs are still painful.  Verbal cues needed for stretching techniques, hold times.      Assessment/Plan  Patient independent with HEP in 2 weeks - MET  Tolerate 10 min standing Elliptical in 2 weeks  Improve function as evidenced by Oxford knee score of 20% or less by discharge (44% impairment initially)  Able to return to work by discharge -  MET  Perform 10 reps of repeated sit to stand without arms in 30 seconds by discharge   Able to SLS 30 sec B by discharge - NOT MET    Progress per Plan of Care up to 12 weeks expiring 22           Timed:         Manual Therapy:         mins  86259;     Therapeutic Exercise:    20     mins  82198;     Neuromuscular Adrienne:        mins  51557;    Therapeutic Activity:     10     mins  92661;     Gait Training:           mins  37479;     Ultrasound:          mins  72848;    Ionto                                   mins   52538  Self Care                            mins   00030      Un-Timed:  Electrical Stimulation:         mins  25743 ( );  Dry Needling          mins self-pay  Traction          mins 11878  Low Eval          Mins  67529  Mod Eval          Mins  84081  High  Eval                            Mins  50079  [facility]                   mins  23671    Timed Treatment:   30   mins   Total Treatment:     30   mins    Robin A Sprigler, PT  Physical Therapist

## 2021-12-13 ENCOUNTER — TREATMENT (OUTPATIENT)
Dept: PHYSICAL THERAPY | Facility: CLINIC | Age: 56
End: 2021-12-13

## 2021-12-13 DIAGNOSIS — M25.562 CHRONIC PAIN OF LEFT KNEE: Primary | ICD-10-CM

## 2021-12-13 DIAGNOSIS — G89.29 CHRONIC PAIN OF LEFT KNEE: Primary | ICD-10-CM

## 2021-12-13 DIAGNOSIS — Z98.890 S/P MEDIAL MENISCAL REPAIR: ICD-10-CM

## 2021-12-13 PROCEDURE — 97530 THERAPEUTIC ACTIVITIES: CPT | Performed by: PHYSICAL THERAPIST

## 2021-12-13 PROCEDURE — 97110 THERAPEUTIC EXERCISES: CPT | Performed by: PHYSICAL THERAPIST

## 2021-12-13 NOTE — PROGRESS NOTES
Physical Therapy Daily Progress Note      Patient: Alicia Melo   : 1965  Diagnosis/ICD-10 Code:  Chronic pain of left knee [M25.562, G89.29]   Problems Addressed this Visit     None      Visit Diagnoses     Chronic pain of left knee    -  Primary    S/P medial meniscal repair          Diagnoses       Codes Comments    Chronic pain of left knee    -  Primary ICD-10-CM: M25.562, G89.29  ICD-9-CM: 719.46, 338.29     S/P medial meniscal repair     ICD-10-CM: Z98.890  ICD-9-CM: V45.89          Referring practitioner: Jl Bah MD  Date of Initial Visit: Type: THERAPY  Noted: 2021  Today's Date: 2021    VISIT#: 4    Subjective Patient reports feeling ok, anxious to receive shot in knee this week. Patient reports knee feels like it is gradually getting a a little stronger.       Objective added patellar mobs     See Exercise, Manual, and Modality Logs for complete treatment.     Assessment/Plan Patient with mild patellar tendon tightness, responded well to added patellar mobs. Patient able to complete all performed therapeutic exercise well with no reports of increased symptoms.   Patient independent with HEP in 2 weeks - MET  Tolerate 10 min standing Elliptical in 2 weeks  Improve function as evidenced by Oxford knee score of 20% or less by discharge (44% impairment initially)  Able to return to work by discharge -  MET  Perform 10 reps of repeated sit to stand without arms in 30 seconds by discharge   Able to SLS 30 sec B by discharge - NOT MET  Progress per Plan of Care         Timed:         Manual Therapy:         mins  72247;     Therapeutic Exercise:    20     mins  04343;     Neuromuscular Adrienne:        mins  71326;    Therapeutic Activity:     15     mins  40824;     Gait Training:           mins  25397;     Ultrasound:          mins  47434;    Ionto                                   mins   72334  Self Care                    _____  mins   60193  [facility]                   mins   88305    Un-Timed:  Electrical Stimulation:         mins  87174 ( );  Dry Needling         mins self-pay  Traction          mins 03226    Timed Treatment:  35    mins   Total Treatment:     35   mins    Ladarius Rogers PTA  [place] License [id number]  Physical Therapist Assistant

## 2021-12-15 ENCOUNTER — TREATMENT (OUTPATIENT)
Dept: PHYSICAL THERAPY | Facility: CLINIC | Age: 56
End: 2021-12-15

## 2021-12-15 DIAGNOSIS — M25.562 CHRONIC PAIN OF LEFT KNEE: Primary | ICD-10-CM

## 2021-12-15 DIAGNOSIS — M25.551 RIGHT HIP PAIN: ICD-10-CM

## 2021-12-15 DIAGNOSIS — Z98.890 S/P MEDIAL MENISCAL REPAIR: ICD-10-CM

## 2021-12-15 DIAGNOSIS — G89.29 CHRONIC PAIN OF LEFT KNEE: Primary | ICD-10-CM

## 2021-12-15 PROCEDURE — 97110 THERAPEUTIC EXERCISES: CPT | Performed by: PHYSICAL THERAPIST

## 2021-12-15 PROCEDURE — 97530 THERAPEUTIC ACTIVITIES: CPT | Performed by: PHYSICAL THERAPIST

## 2021-12-15 NOTE — PROGRESS NOTES
Physical Therapy Daily Progress Note      Patient: Alicia Melo   : 1965  Diagnosis/ICD-10 Code:  Chronic pain of left knee [M25.562, G89.29]   Problems Addressed this Visit     None      Visit Diagnoses     Chronic pain of left knee    -  Primary    S/P medial meniscal repair        Right hip pain          Diagnoses       Codes Comments    Chronic pain of left knee    -  Primary ICD-10-CM: M25.562, G89.29  ICD-9-CM: 719.46, 338.29     S/P medial meniscal repair     ICD-10-CM: Z98.890  ICD-9-CM: V45.89     Right hip pain     ICD-10-CM: M25.551  ICD-9-CM: 719.45          Referring practitioner: Jl Bah MD  Date of Initial Visit: Type: THERAPY  Noted: 2021  Today's Date: 12/15/2021    VISIT#: 5    Subjective Patient reports receiving injection in L knee yesterday and is feeling some increased soreness from injection.       Objective  Progresses stair height,     See Exercise, Manual, and Modality Logs for complete treatment.     Assessment/Plan Patient tolerated performed therapeutic exercise well with only mild discomfort with descending stairs. Patient will continue to benefit from improved lower extremity strengthening.   Patient independent with HEP in 2 weeks - MET  Tolerate 10 min standing Elliptical in 2 weeks  Improve function as evidenced by Oxford knee score of 20% or less by discharge (44% impairment initially)  Able to return to work by discharge -  MET  Perform 10 reps of repeated sit to stand without arms in 30 seconds by discharge   Able to SLS 30 sec B by discharge - NOT MET  Progress per Plan of Care and Progress strengthening /stabilization /functional activity         Timed:         Manual Therapy:         mins  15658;     Therapeutic Exercise:    20     mins  87159;     Neuromuscular Adrienne:        mins  69473;    Therapeutic Activity:     15     mins  94408;     Gait Training:           mins  88870;     Ultrasound:          mins  80063;    Ionto                                    mins   35214  Self Care                    _____  mins   14325  Canalith Repos                   mins  34269    Un-Timed:  Electrical Stimulation:         mins  13906 ( );  Dry Needling          mins self-pay  Traction          mins 88293    Timed Treatment:   35   mins   Total Treatment:     35   mins    Ladarius Rogers PTA  [place] License [id number]  Physical Therapist Assistant

## 2021-12-20 ENCOUNTER — TREATMENT (OUTPATIENT)
Dept: PHYSICAL THERAPY | Facility: CLINIC | Age: 56
End: 2021-12-20

## 2021-12-20 DIAGNOSIS — Z98.890 S/P MEDIAL MENISCAL REPAIR: ICD-10-CM

## 2021-12-20 DIAGNOSIS — G89.29 CHRONIC PAIN OF LEFT KNEE: Primary | ICD-10-CM

## 2021-12-20 DIAGNOSIS — M25.562 CHRONIC PAIN OF LEFT KNEE: Primary | ICD-10-CM

## 2021-12-20 PROCEDURE — 97530 THERAPEUTIC ACTIVITIES: CPT | Performed by: PHYSICAL THERAPIST

## 2021-12-20 PROCEDURE — 97110 THERAPEUTIC EXERCISES: CPT | Performed by: PHYSICAL THERAPIST

## 2021-12-20 NOTE — PROGRESS NOTES
Physical Therapy Daily Progress Note    Patient: Alicia Melo   : 1965  Diagnosis/ICD-10 Code:  Chronic pain of left knee [M25.562, G89.29]  Referring practitioner: Jl Bah MD  Date of Initial Visit: Type: THERAPY  Noted: 2021  Today's Date: 2021  Patient seen for 6 sessions             Subjective Patient reports no change with first injection.  She is having the 2nd one tomorrow.  She has noticed pain on L medial hamstring after increasing step height last visit. She is the wife of a  and they are going to [place] on the  and hope to do some hiking.      Objective   See Exercise, Manual, and Modality Logs for complete treatment. Crepitus noted under patella.  Tenderness to palpation L medial hamstring.  SLS improving with 17 sec on L, 10 sec on R.  Progressed to SLS with 3 way kicks for balance and stability.  Added incline board gastroc stretch.      Assessment/Plan  Patient independent with HEP in 2 weeks - MET  Tolerate 10 min standing Elliptical in 2 weeks - MET  Improve function as evidenced by Oxford knee score of 20% or less by discharge (44% impairment initially) - NOT MET  Able to return to work by discharge -  MET  Perform 10 reps of repeated sit to stand without arms in 30 seconds by discharge - MET  Able to SLS 30 sec B by discharge - PARTIALLY MET (L = 17 sec, R = 10 sec)    Progress per Plan of Care expiring 22           Timed:         Manual Therapy:         mins  93456;     Therapeutic Exercise:    20     mins  71485;     Neuromuscular Adrienne:        mins  73826;    Therapeutic Activity:     15     mins  16242;     Gait Training:           mins  14371;     Ultrasound:          mins  25916;    Ionto                                   mins   41948  Self Care                            mins   82592      Un-Timed:  Electrical Stimulation:         mins  13049 ( );  Dry Needling          mins self-pay  Traction          mins 27510  Low Eval           Mins  64236  Mod Eval          Mins  89437  High Eval                            Mins  60153  Canalith Repos                   mins  99085    Timed Treatment:   35   mins   Total Treatment:     35   mins    Robin A Sprigler, PT  Physical Therapist

## 2022-01-20 ENCOUNTER — DOCUMENTATION (OUTPATIENT)
Dept: PHYSICAL THERAPY | Facility: CLINIC | Age: 57
End: 2022-01-20

## 2022-01-20 DIAGNOSIS — G89.29 CHRONIC PAIN OF LEFT KNEE: Primary | ICD-10-CM

## 2022-01-20 DIAGNOSIS — M25.562 CHRONIC PAIN OF LEFT KNEE: Primary | ICD-10-CM

## 2022-01-20 DIAGNOSIS — Z98.890 S/P MEDIAL MENISCAL REPAIR: ICD-10-CM

## 2022-01-20 NOTE — PROGRESS NOTES
Discharge Summary  Discharge Summary from Physical Therapy Report      Goals: Partially Met    Discharge Plan: Continue with current home exercise program as instructed    Comments  Patient failed to return for treatment.  Last seen 12/20/2021  Patient seen for 6 sessions                  Subjective Patient reports no change with first injection.  She is having the 2nd one tomorrow.  She has noticed pain on L medial hamstring after increasing step height last visit. She is the wife of a  and they are going to [place] on the 27th and hope to do some hiking.       Objective   See Exercise, Manual, and Modality Logs for complete treatment. Crepitus noted under patella.  Tenderness to palpation L medial hamstring.  SLS improving with 17 sec on L, 10 sec on R.  Progressed to SLS with 3 way kicks for balance and stability.  Added incline board gastroc stretch.        Assessment/Plan  Patient independent with HEP in 2 weeks - MET  Tolerate 10 min standing Elliptical in 2 weeks - MET  Improve function as evidenced by Oxford knee score of 20% or less by discharge (44% impairment initially) - NOT MET  Able to return to work by discharge -  MET  Perform 10 reps of repeated sit to stand without arms in 30 seconds by discharge - MET  Able to SLS 30 sec B by discharge - PARTIALLY MET (L = 17 sec, R = 10 sec)      Date of Discharge 1/20/22        Robin A Sprigler, PT  Physical Therapist

## 2022-06-08 ENCOUNTER — HOSPITAL ENCOUNTER (OUTPATIENT)
Dept: CARDIOLOGY | Facility: HOSPITAL | Age: 57
Discharge: HOME | End: 2022-06-08

## 2022-06-08 ENCOUNTER — TRANSCRIBE ORDERS (OUTPATIENT)
Dept: ADMINISTRATIVE | Facility: HOSPITAL | Age: 57
End: 2022-06-08

## 2022-06-08 ENCOUNTER — LAB (OUTPATIENT)
Dept: LAB | Facility: HOSPITAL | Age: 57
End: 2022-06-08

## 2022-06-08 DIAGNOSIS — Z01.818 PRE-OP EXAMINATION: ICD-10-CM

## 2022-06-08 DIAGNOSIS — Z01.818 PRE-OP EXAMINATION: Primary | ICD-10-CM

## 2022-06-08 LAB
ALBUMIN SERPL-MCNC: 4.8 G/DL (ref 3.5–5.2)
ANION GAP SERPL CALCULATED.3IONS-SCNC: 7.4 MMOL/L (ref 5–15)
BASOPHILS # BLD AUTO: 0.03 10*3/MM3 (ref 0–0.2)
BASOPHILS NFR BLD AUTO: 0.6 % (ref 0–1.5)
BUN SERPL-MCNC: 12 MG/DL (ref 6–20)
BUN/CREAT SERPL: 14.6 (ref 7–25)
CALCIUM SPEC-SCNC: 9.4 MG/DL (ref 8.6–10.5)
CHLORIDE SERPL-SCNC: 101 MMOL/L (ref 98–107)
CO2 SERPL-SCNC: 30.6 MMOL/L (ref 22–29)
CREAT SERPL-MCNC: 0.82 MG/DL (ref 0.57–1)
DEPRECATED RDW RBC AUTO: 40 FL (ref 37–54)
EGFRCR SERPLBLD CKD-EPI 2021: 83.5 ML/MIN/1.73
EOSINOPHIL # BLD AUTO: 0.14 10*3/MM3 (ref 0–0.4)
EOSINOPHIL NFR BLD AUTO: 2.8 % (ref 0.3–6.2)
ERYTHROCYTE [DISTWIDTH] IN BLOOD BY AUTOMATED COUNT: 12.3 % (ref 12.3–15.4)
GLUCOSE SERPL-MCNC: 87 MG/DL (ref 65–99)
HBA1C MFR BLD: 5.3 % (ref 3.5–5.6)
HCT VFR BLD AUTO: 40.7 % (ref 34–46.6)
HGB BLD-MCNC: 13.6 G/DL (ref 12–15.9)
IMM GRANULOCYTES # BLD AUTO: 0.01 10*3/MM3 (ref 0–0.05)
IMM GRANULOCYTES NFR BLD AUTO: 0.2 % (ref 0–0.5)
LYMPHOCYTES # BLD AUTO: 1.89 10*3/MM3 (ref 0.7–3.1)
LYMPHOCYTES NFR BLD AUTO: 38.3 % (ref 19.6–45.3)
MCH RBC QN AUTO: 30.2 PG (ref 26.6–33)
MCHC RBC AUTO-ENTMCNC: 33.4 G/DL (ref 31.5–35.7)
MCV RBC AUTO: 90.4 FL (ref 79–97)
MONOCYTES # BLD AUTO: 0.34 10*3/MM3 (ref 0.1–0.9)
MONOCYTES NFR BLD AUTO: 6.9 % (ref 5–12)
MRSA DNA SPEC QL NAA+PROBE: NORMAL
NEUTROPHILS NFR BLD AUTO: 2.53 10*3/MM3 (ref 1.7–7)
NEUTROPHILS NFR BLD AUTO: 51.2 % (ref 42.7–76)
NRBC BLD AUTO-RTO: 0 /100 WBC (ref 0–0.2)
PLATELET # BLD AUTO: 261 10*3/MM3 (ref 140–450)
PMV BLD AUTO: 9 FL (ref 6–12)
POTASSIUM SERPL-SCNC: 4.7 MMOL/L (ref 3.5–5.2)
RBC # BLD AUTO: 4.5 10*6/MM3 (ref 3.77–5.28)
SODIUM SERPL-SCNC: 139 MMOL/L (ref 136–145)
WBC NRBC COR # BLD: 4.94 10*3/MM3 (ref 3.4–10.8)

## 2022-06-08 PROCEDURE — 93010 ELECTROCARDIOGRAM REPORT: CPT | Performed by: INTERNAL MEDICINE

## 2022-06-08 PROCEDURE — 80048 BASIC METABOLIC PNL TOTAL CA: CPT

## 2022-06-08 PROCEDURE — 36415 COLL VENOUS BLD VENIPUNCTURE: CPT

## 2022-06-08 PROCEDURE — 85025 COMPLETE CBC W/AUTO DIFF WBC: CPT

## 2022-06-08 PROCEDURE — 82040 ASSAY OF SERUM ALBUMIN: CPT

## 2022-06-08 PROCEDURE — 93005 ELECTROCARDIOGRAM TRACING: CPT | Performed by: ORTHOPAEDIC SURGERY

## 2022-06-08 PROCEDURE — 83036 HEMOGLOBIN GLYCOSYLATED A1C: CPT

## 2022-06-08 PROCEDURE — 87641 MR-STAPH DNA AMP PROBE: CPT

## 2022-06-13 LAB — QT INTERVAL: 397 MS

## 2022-06-29 ENCOUNTER — HOME HEALTH ADMISSION (OUTPATIENT)
Dept: HOME HEALTH SERVICES | Facility: HOME HEALTHCARE | Age: 57
End: 2022-06-29

## 2022-06-29 ENCOUNTER — TRANSCRIBE ORDERS (OUTPATIENT)
Dept: HOME HEALTH SERVICES | Facility: HOME HEALTHCARE | Age: 57
End: 2022-06-29

## 2022-06-29 DIAGNOSIS — Z96.652 AFTERCARE FOLLOWING LEFT KNEE JOINT REPLACEMENT SURGERY: Primary | ICD-10-CM

## 2022-06-29 DIAGNOSIS — Z47.1 AFTERCARE FOLLOWING LEFT KNEE JOINT REPLACEMENT SURGERY: Primary | ICD-10-CM

## 2022-07-06 ENCOUNTER — HOME CARE VISIT (OUTPATIENT)
Dept: HOME HEALTH SERVICES | Facility: HOME HEALTHCARE | Age: 57
End: 2022-07-06

## 2022-07-06 VITALS
DIASTOLIC BLOOD PRESSURE: 80 MMHG | SYSTOLIC BLOOD PRESSURE: 124 MMHG | RESPIRATION RATE: 18 BRPM | HEART RATE: 65 BPM | OXYGEN SATURATION: 97 % | TEMPERATURE: 97.6 F

## 2022-07-06 PROCEDURE — G0151 HHCP-SERV OF PT,EA 15 MIN: HCPCS

## 2022-07-06 NOTE — HOME HEALTH
Pt referred for PT sp L TKR. Pt with non removable dressing. Pt aware will be removed at [facility]. Skilled PT for ROM and strengthening, transfer training, balance and gait training with focus of care for L TKR.    Environment Lives with spouse in a single story house with 2 steps to get in and out. House is clutter free. Spouse works from home and is able to assist patient.     Plan for next visit ROM and strengthening, transfer training, balance and gait training.

## 2022-07-08 ENCOUNTER — HOME CARE VISIT (OUTPATIENT)
Dept: HOME HEALTH SERVICES | Facility: HOME HEALTHCARE | Age: 57
End: 2022-07-08

## 2022-07-08 VITALS
HEART RATE: 68 BPM | DIASTOLIC BLOOD PRESSURE: 90 MMHG | TEMPERATURE: 98 F | OXYGEN SATURATION: 98 % | SYSTOLIC BLOOD PRESSURE: 120 MMHG

## 2022-07-08 PROCEDURE — G0151 HHCP-SERV OF PT,EA 15 MIN: HCPCS

## 2022-07-08 NOTE — HOME HEALTH
Patient was seen today, agreeable with PT. Patient compliant with plan of care. Tolerated all management well with sufficient rest breaks provided. If patient was complaining of SOB, patient was recommended to perform pursed lip breathing. PT today received thera ex, gait training, balance ex, HEP teaching and transfer training      Plan next visit: HEP teaching, gait training

## 2022-07-11 ENCOUNTER — HOME CARE VISIT (OUTPATIENT)
Dept: HOME HEALTH SERVICES | Facility: HOME HEALTHCARE | Age: 57
End: 2022-07-11

## 2022-07-11 VITALS
TEMPERATURE: 98 F | SYSTOLIC BLOOD PRESSURE: 140 MMHG | DIASTOLIC BLOOD PRESSURE: 90 MMHG | OXYGEN SATURATION: 98 % | HEART RATE: 89 BPM

## 2022-07-11 PROCEDURE — G0151 HHCP-SERV OF PT,EA 15 MIN: HCPCS

## 2022-07-11 NOTE — HOME HEALTH
Patient was seen today, agreeable with PT. Patient compliant with plan of care. Tolerated all management well with sufficient rest breaks provided. If patient was complaining of SOB, patient was recommended to perform pursed lip breathing. PT today received thera ex, gait training, balance ex, HEP teaching and transfer training      Plan next visit:

## 2022-07-13 ENCOUNTER — HOME CARE VISIT (OUTPATIENT)
Dept: HOME HEALTH SERVICES | Facility: HOME HEALTHCARE | Age: 57
End: 2022-07-13

## 2022-07-13 VITALS
DIASTOLIC BLOOD PRESSURE: 80 MMHG | SYSTOLIC BLOOD PRESSURE: 124 MMHG | TEMPERATURE: 97.4 F | OXYGEN SATURATION: 95 % | RESPIRATION RATE: 20 BRPM | HEART RATE: 78 BPM

## 2022-07-13 PROCEDURE — G0151 HHCP-SERV OF PT,EA 15 MIN: HCPCS

## 2022-07-18 ENCOUNTER — TRANSCRIBE ORDERS (OUTPATIENT)
Dept: PHYSICAL THERAPY | Facility: CLINIC | Age: 57
End: 2022-07-18

## 2022-07-18 ENCOUNTER — TREATMENT (OUTPATIENT)
Dept: PHYSICAL THERAPY | Facility: CLINIC | Age: 57
End: 2022-07-18

## 2022-07-18 DIAGNOSIS — Z96.652 S/P TKR (TOTAL KNEE REPLACEMENT), LEFT: Primary | ICD-10-CM

## 2022-07-18 DIAGNOSIS — Z96.652 PRESENCE OF LEFT ARTIFICIAL KNEE JOINT: Primary | ICD-10-CM

## 2022-07-18 DIAGNOSIS — R26.2 DIFFICULTY WALKING: ICD-10-CM

## 2022-07-18 DIAGNOSIS — M25.562 ACUTE PAIN OF LEFT KNEE: ICD-10-CM

## 2022-07-18 PROCEDURE — 97110 THERAPEUTIC EXERCISES: CPT | Performed by: PHYSICAL THERAPIST

## 2022-07-18 PROCEDURE — 97161 PT EVAL LOW COMPLEX 20 MIN: CPT | Performed by: PHYSICAL THERAPIST

## 2022-07-18 NOTE — PROGRESS NOTES
Physical Therapy Initial Evaluation and Plan of Care    Patient: Alicia Melo   : 1965  Diagnosis/ICD-10 Code:  S/P TKR (total knee replacement), left [Z96.652]  Referring practitioner: Dr. Rigoberto Pickering  Date of Initial Visit: 2022  Today's Date: 2022  Patient seen for 1 sessions           Subjective Questionnaire: PT Functional Test: Oxford Knee = 5/48, indicating 10% ability and 90% impairment  LEFS = 0/80, indicating 0% ability and 100% impairment      Subjective Evaluation    History of Present Illness  Date of surgery: 2022  Mechanism of injury: Pt is almost 3 wk s/p L TKR. She had 4 HHPT visits. Pt uses [facility] 1st thing in the morning and sometimes later in the day. Pt states she returned to work last week. Working half days in office. Using ice pack regularly throughout the day. Taking pain med before bed and advil prn during the day. Pt wants to get back to hiking, doing aerobics, walking for exercise.      Patient Occupation:  of  Quality of life: excellent    Pain  Current pain ratin  At best pain ratin  At worst pain rating: 10  Location: L knee  Quality: dull ache, sharp, tight and discomfort  Relieving factors: ice, medications, change in position and rest  Aggravating factors: movement, standing, sleeping, prolonged positioning, ambulation and stairs  Progression: improved    Social Support  Lives in: one-story house  Lives with: spouse    Treatments  Previous treatment: home therapy  Discharged from (in last 30 days): home health care  Patient Goals  Patient goals for therapy: decreased edema, decreased pain, improved balance, increased motion, increased strength, independence with ADLs/IADLs, return to sport/leisure activities and return to work             Objective          Observations   Left Knee   Positive for edema and incision.     Additional Knee Observation Details  L TKR incision healing well. No signs of infection.    Gait: ambulating  with standard cane. Decreased konstantin, decreased L heel strike and toe off.    Active Range of Motion   Left Knee   Flexion: 92 degrees   Extensor la degrees     Right Knee   Normal active range of motion    Passive Range of Motion   Left Knee   Flexion: 95 degrees     Strength/Myotome Testing     Left Knee   Flexion: 3+  Extension: 3+  Quadriceps contraction: fair    Right Knee   Flexion: 5  Extension: 5    Additional Strength Details  B ankle DF 5/5          Assessment & Plan     Assessment  Impairments: abnormal gait, abnormal muscle firing, abnormal or restricted ROM, activity intolerance, impaired balance, impaired physical strength, lacks appropriate home exercise program, pain with function and weight-bearing intolerance  Functional Limitations: sleeping, walking, pushing, uncomfortable because of pain, moving in bed, sitting, standing, stooping and unable to perform repetitive tasks  Assessment details: Pt is 55 yo female ~3 wk s/p L TKR. Pt presents with decreased strength and ROM of L knee. Pt is having difficulty sleeping. Difficulty with all ambulation. Difficulty with transfers. Pt's goals are to be able to hike and exercise. Oxford knee score indicates 90% impairment and LEFS indicates 100% impairment.    Patient presents with the impairments listed above and based on the objective findings and the physical therapy evaluation, the patient’s condition has the potential to improve in response to therapy.   The patient’s condition and/or services required are at a level of complexity that necessitates the skill & supervision of a physical therapist.     Prognosis: good    Goals  Plan Goals: STG to be met in 3 wk:  - Increase L knee AROM to 0-110 deg.  - Pt to ambulate without assistive devide safely in clinic with good gait pattern.  - Pt to be independent with HEP.  LTG to be met in 12 wk:  - Improve Oxford Knee to 15% or less impairment and LEFS to 80% ability.  - Increase L knee strength to 4+/5 or  greater for improved stair climbing.  - Ambulation to be returned to [facility].    Plan  Therapy options: will be seen for skilled therapy services  Planned modality interventions: thermotherapy (hydrocollator packs), electrical stimulation/Russian stimulation and cryotherapy  Other planned modality interventions: modalities as indicated  Planned therapy interventions: balance/weight-bearing training, body mechanics training, compression, flexibility, functional ROM exercises, gait training, home exercise program, joint mobilization, manual therapy, neuromuscular re-education, postural training, soft tissue mobilization, strengthening, stretching, therapeutic activities and transfer training  Frequency: 2x week  Duration in weeks: 12  Treatment plan discussed with: patient        Timed:         Manual Therapy:    3     mins  18653;     Therapeutic Exercise:    23     mins  32763;     Neuromuscular Adrienne:        mins  54809;    Therapeutic Activity:          mins  74594;     Gait Training:           mins  62262;     Ultrasound:          mins  26857;    Ionto                                   mins   41251  Self - Care                          mins  38727    Un-Timed:  Electrical Stimulation:         mins  67278 ( );  Dry Needling          16550/47804  Traction          mins 18339  Can Repos          mins 91625  Low Eval     20     Mins  96258  Mod Eval          Mins  83823  High Eval                            Mins  63178      Timed Treatment:   26   mins   Total Treatment:     56   mins      PT SIGNATURE: Jocelynn Cunningham PT, CLT  [place] Lic # [id number]  Electronically signed by Jocelynn Cunningham PT, 07/18/22, 2:37 PM EDT    Initial Certification  Certification Period: 7/18/2022 thru 10/15/2022  I certify that the therapy services are furnished while this patient is under my care.  The services outlined above are required by this patient, and will be reviewed every 90 days.     PHYSICIAN: Jl Bah,  MD _____________________________________________________  NPI: [id number]                                      DATE:    Please sign and return via fax to [phone]. Thank you, [facility].

## 2022-07-21 ENCOUNTER — TREATMENT (OUTPATIENT)
Dept: PHYSICAL THERAPY | Facility: CLINIC | Age: 57
End: 2022-07-21

## 2022-07-21 DIAGNOSIS — Z96.652 S/P TKR (TOTAL KNEE REPLACEMENT), LEFT: Primary | ICD-10-CM

## 2022-07-21 DIAGNOSIS — M25.562 ACUTE PAIN OF LEFT KNEE: ICD-10-CM

## 2022-07-21 DIAGNOSIS — R26.2 DIFFICULTY WALKING: ICD-10-CM

## 2022-07-21 PROCEDURE — 97110 THERAPEUTIC EXERCISES: CPT | Performed by: PHYSICAL THERAPIST

## 2022-07-21 PROCEDURE — 97530 THERAPEUTIC ACTIVITIES: CPT | Performed by: PHYSICAL THERAPIST

## 2022-07-21 NOTE — PROGRESS NOTES
Physical Therapy Daily Progress Note      Patient: Alicia Melo   : 1965  Diagnosis/ICD-10 Code:  S/P TKR (total knee replacement), left [Z96.652]   Problems Addressed this Visit    None     Visit Diagnoses     S/P TKR (total knee replacement), left    -  Primary    Acute pain of left knee        Difficulty walking          Diagnoses       Codes Comments    S/P TKR (total knee replacement), left    -  Primary ICD-10-CM: Z96.652  ICD-9-CM: V43.65     Acute pain of left knee     ICD-10-CM: M25.562  ICD-9-CM: 719.46     Difficulty walking     ICD-10-CM: R26.2  ICD-9-CM: 719.7          Referring practitioner: Rigoberto Pickering MD  Date of Initial Visit: Type: THERAPY  Noted: 2022  Today's Date: 2022    VISIT#: 2    Subjective Patient reports her knee is feeling okay today and feels like she is getting stronger every day.  She is planning to return to work full time next week.      Objective     See Exercise, Manual, and Modality Logs for complete treatment.     Assessment/Plan Progressed exercise with addition of leg press and TKE with no complaints of pain with activity.  Patient curious as to when she can wean off the cane.  Patient educated on importance of increased quads strength to accept full weight bearing prior to discontinuation of cane.  Patient verbalized understanding.  Added TKE and heel raises to patient's HEP.    Progress per Plan of Care and Progress strengthening /stabilization /functional activity         Timed:         Manual Therapy:    5     mins  94526;     Therapeutic Exercise:    25     mins  43398;     Neuromuscular Adrienne:        mins  44013;    Therapeutic Activity:     10     mins  32503;     Gait Training:           mins  03409;     Ultrasound:          mins  28578;    Ionto                                   mins   75764    Un-Timed:  Electrical Stimulation:         mins  24612 ( );  Dry Needling          mins self-pay ;   Traction          mins  57115      Timed Treatment:   40   mins   Total Treatment:     50   mins    Angi Palmer PT  [place] License # [id number]  Physical Therapist

## 2022-07-26 ENCOUNTER — TREATMENT (OUTPATIENT)
Dept: PHYSICAL THERAPY | Facility: CLINIC | Age: 57
End: 2022-07-26

## 2022-07-26 DIAGNOSIS — Z96.652 S/P TKR (TOTAL KNEE REPLACEMENT), LEFT: Primary | ICD-10-CM

## 2022-07-26 DIAGNOSIS — M25.562 ACUTE PAIN OF LEFT KNEE: ICD-10-CM

## 2022-07-26 DIAGNOSIS — R26.2 DIFFICULTY WALKING: ICD-10-CM

## 2022-07-26 PROCEDURE — 97110 THERAPEUTIC EXERCISES: CPT | Performed by: PHYSICAL THERAPIST

## 2022-07-26 PROCEDURE — 97140 MANUAL THERAPY 1/> REGIONS: CPT | Performed by: PHYSICAL THERAPIST

## 2022-07-26 PROCEDURE — 97112 NEUROMUSCULAR REEDUCATION: CPT | Performed by: PHYSICAL THERAPIST

## 2022-07-26 NOTE — PROGRESS NOTES
Physical Therapy Daily Progress Note      Patient: Alicia Melo   : 1965  Diagnosis/ICD-10 Code:  S/P TKR (total knee replacement), left [Z96.652]   Problems Addressed this Visit    None     Visit Diagnoses     S/P TKR (total knee replacement), left    -  Primary    Acute pain of left knee        Difficulty walking          Diagnoses       Codes Comments    S/P TKR (total knee replacement), left    -  Primary ICD-10-CM: Z96.652  ICD-9-CM: V43.65     Acute pain of left knee     ICD-10-CM: M25.562  ICD-9-CM: 719.46     Difficulty walking     ICD-10-CM: R26.2  ICD-9-CM: 719.7          Referring practitioner: Rigoberto Pickering MD  Date of Initial Visit: Type: THERAPY  Noted: 2022  Today's Date: 2022    VISIT#: 3    Subjective Patient reports her knee has more swollen and painful for the past 4 days.  She took her last dose of her steroid 6 days ago and is curious if this could be causing the increased symptoms.        Objective   L knee AROM 0-4-102 degrees  See Exercise, Manual, and Modality Logs for complete treatment.     Assessment/Plan Progressed strengthening and mobility with addition of knee flexion stretch in standing, MIP, and increased resistance with TKE.  Patient continues to demonstrate a mild knee extensor lag with SLR and with alternating MIP. Added MIP to patient's HEP for improved weight bearing tolerance.  Increased L knee AROM noted per objective findings above.      Progress per Plan of Care and Progress strengthening /stabilization /functional activity         Timed:         Manual Therapy:    8     mins  41644;     Therapeutic Exercise:    20     mins  16029;     Neuromuscular Adrienne:    15    mins  23664;    Therapeutic Activity:          mins  24826;     Gait Training:           mins  60906;     Ultrasound:          mins  42754;    Ionto                                   mins   03512    Un-Timed:  Electrical Stimulation:         mins  10929 ( );  Dry Needling           mins self-pay 20560; 20561  [facility]          mins 79563      Timed Treatment:   43   mins   Total Treatment:     53   mins    Angi Palmer PT  [place] License # [id number]  Physical Therapist   Event Note

## 2022-07-28 ENCOUNTER — TREATMENT (OUTPATIENT)
Dept: PHYSICAL THERAPY | Facility: CLINIC | Age: 57
End: 2022-07-28

## 2022-07-28 DIAGNOSIS — R26.2 DIFFICULTY WALKING: ICD-10-CM

## 2022-07-28 DIAGNOSIS — M25.562 ACUTE PAIN OF LEFT KNEE: ICD-10-CM

## 2022-07-28 DIAGNOSIS — Z96.652 S/P TKR (TOTAL KNEE REPLACEMENT), LEFT: Primary | ICD-10-CM

## 2022-07-28 PROCEDURE — 97110 THERAPEUTIC EXERCISES: CPT | Performed by: PHYSICAL THERAPIST

## 2022-07-28 PROCEDURE — 97530 THERAPEUTIC ACTIVITIES: CPT | Performed by: PHYSICAL THERAPIST

## 2022-07-28 NOTE — PROGRESS NOTES
Physical Therapy Daily Progress Note      Patient: Alicia Melo   : 1965  Diagnosis/ICD-10 Code:  S/P TKR (total knee replacement), left [Z96.652]   Problems Addressed this Visit    None     Visit Diagnoses     S/P TKR (total knee replacement), left    -  Primary    Acute pain of left knee        Difficulty walking          Diagnoses       Codes Comments    S/P TKR (total knee replacement), left    -  Primary ICD-10-CM: Z96.652  ICD-9-CM: V43.65     Acute pain of left knee     ICD-10-CM: M25.562  ICD-9-CM: 719.46     Difficulty walking     ICD-10-CM: R26.2  ICD-9-CM: 719.7          Referring practitioner: Rigoberto Pickering MD  Date of Initial Visit: Type: THERAPY  Noted: 2022  Today's Date: 2022    VISIT#: 4    Subjective Patient reports her knee continues to feel sore and more fatigued as the day progresses.  She has started to increase the time on her feet at home.      Objective     See Exercise, Manual, and Modality Logs for complete treatment.     Assessment/Plan Progressed strengthening and functional activities with addition of squats and step ups.  Patient demonstrated an extensor lag and LLE antalgia with step ups this date.  She also demonstrated decreased weight bearing through LLE with squats but improved with verbal cueing.  Continue to progress ROM and strengthening as tolerated.    Progress per Plan of Care and Progress strengthening /stabilization /functional activity         Timed:         Manual Therapy:         mins  16848;     Therapeutic Exercise:    25     mins  60880;     Neuromuscular Adrienne:        mins  84246;    Therapeutic Activity:     15     mins  53599;     Gait Training:           mins  03602;     Ultrasound:          mins  74844;    Ionto                                   mins   80843    Un-Timed:  Electrical Stimulation:         mins  82485 ( );  Dry Needling          mins self-pay ;   Traction          mins 87102      Timed Treatment:    40   mins   Total Treatment:     50   mins    Angi Palmer, PT  [place] License # [id number]  Physical Therapist

## 2022-08-02 ENCOUNTER — TREATMENT (OUTPATIENT)
Dept: PHYSICAL THERAPY | Facility: CLINIC | Age: 57
End: 2022-08-02

## 2022-08-02 DIAGNOSIS — M25.562 ACUTE PAIN OF LEFT KNEE: ICD-10-CM

## 2022-08-02 DIAGNOSIS — Z96.652 S/P TKR (TOTAL KNEE REPLACEMENT), LEFT: Primary | ICD-10-CM

## 2022-08-02 DIAGNOSIS — R26.2 DIFFICULTY WALKING: ICD-10-CM

## 2022-08-02 PROCEDURE — 97110 THERAPEUTIC EXERCISES: CPT | Performed by: PHYSICAL THERAPIST

## 2022-08-02 PROCEDURE — 97530 THERAPEUTIC ACTIVITIES: CPT | Performed by: PHYSICAL THERAPIST

## 2022-08-02 NOTE — PROGRESS NOTES
Physical Therapy Daily Progress Note      Patient: Alicia Melo   : 1965  Diagnosis/ICD-10 Code:  S/P TKR (total knee replacement), left [Z96.652]  Referring practitioner: Rigoberto Pickering MD  Date of Initial Visit: Type: THERAPY  Noted: 2022  Today's Date: 2022  Patient seen for 5 sessions         Alicia Melo reports: she is doing about the same today and states she has days where it seems better and days where it seems wore so she isn't sure if she's really doing better or not. Pt. Reports to treatment 20 minutes late this visit.    Objective   See Exercise, Manual, and Modality Logs for complete treatment.     Assessment/Plan   Pt. ambulates with cane this date and significant weight shift compensation over R LE to avoid L LE weightbearing. Pt. Responded well to education that her mobility is much better than she perceives it and the pain she is having is understandable even at 5 weeks Pt. Appeared have a little less tension and more ease of mind at the end of this session.    Goals  Plan Goals: STG to be met in 3 wk:  - Increase L knee AROM to 0-110 deg.  - Pt to ambulate without assistive devide safely in clinic with good gait pattern.  - Pt to be independent with HEP.  LTG to be met in 12 wk:  - Improve [place] Knee to 15% or less impairment and LEFS to 80% ability.  - Increase L knee strength to 4+/5 or greater for improved stair climbing.  - Ambulation to be returned to [facility].    Progress strengthening /stabilization /functional activity           Timed:           Therapeutic Exercise:    20     mins  82822;     Therapeutic Activity:     15     mins  76989;         Timed Treatment:  35  mins   Total Treatment:    45    mins    Emelina Ye PTA  Physical Therapist Assistant License #[id number]

## 2022-08-04 ENCOUNTER — TREATMENT (OUTPATIENT)
Dept: PHYSICAL THERAPY | Facility: CLINIC | Age: 57
End: 2022-08-04

## 2022-08-04 DIAGNOSIS — M25.562 ACUTE PAIN OF LEFT KNEE: ICD-10-CM

## 2022-08-04 DIAGNOSIS — R26.2 DIFFICULTY WALKING: ICD-10-CM

## 2022-08-04 DIAGNOSIS — Z96.652 S/P TKR (TOTAL KNEE REPLACEMENT), LEFT: Primary | ICD-10-CM

## 2022-08-04 PROCEDURE — 97140 MANUAL THERAPY 1/> REGIONS: CPT | Performed by: PHYSICAL THERAPIST

## 2022-08-04 PROCEDURE — 97530 THERAPEUTIC ACTIVITIES: CPT | Performed by: PHYSICAL THERAPIST

## 2022-08-04 PROCEDURE — 97110 THERAPEUTIC EXERCISES: CPT | Performed by: PHYSICAL THERAPIST

## 2022-08-04 NOTE — PROGRESS NOTES
Physical Therapy Daily Progress Note      Patient: Alicia Melo   : 1965  Diagnosis/ICD-10 Code:  S/P TKR (total knee replacement), left [Z96.652]   Problems Addressed this Visit    None     Visit Diagnoses     S/P TKR (total knee replacement), left    -  Primary    Acute pain of left knee        Difficulty walking          Diagnoses       Codes Comments    S/P TKR (total knee replacement), left    -  Primary ICD-10-CM: Z96.652  ICD-9-CM: V43.65     Acute pain of left knee     ICD-10-CM: M25.562  ICD-9-CM: 719.46     Difficulty walking     ICD-10-CM: R26.2  ICD-9-CM: 719.7          Referring practitioner: Rigoberto Pickering MD  Date of Initial Visit: Type: THERAPY  Noted: 2022  Today's Date: 2022    VISIT#: 6    Subjective Patient reports her L lower leg and knee continue to swell as the day progresses.  She has been trying to ice and elevate frequently to help.        Objective   L knee AROM 0-4-109 degrees  See Exercise, Manual, and Modality Logs for complete treatment.     Assessment/Plan Progressed to lateral step ups with patient demonstrating increased contralateral push off. Patient required min UE support to minimize LLE antalgia with concentric movement of step up.  Increased L knee AROM noted to 109 degrees of flexion per obj findings.  Patient educated on use of compression stockings to minimize swelling especially with increased time on her feet.  Patient is going to be out of town next week at a conference so is planning to purchase compression stockings to wear while there.    Progress per Plan of Care and Progress strengthening /stabilization /functional activity         Timed:         Manual Therapy:    10     mins  53736;     Therapeutic Exercise:    18     mins  78439;     Neuromuscular Adrienne:        mins  79230;    Therapeutic Activity:     12     mins  90918;     Gait Training:           mins  64508;     Ultrasound:          mins  75389;    Ionto                                    mins   29336    Un-Timed:  Electrical Stimulation:         mins  47261 ( );  Dry Needling          mins self-pay 20560; 20561  Traction          mins 15823      Timed Treatment:   40   mins   Total Treatment:     50   mins    Angi Palmer PT  [place] License # [id number]  Physical Therapist

## 2022-08-16 ENCOUNTER — TREATMENT (OUTPATIENT)
Dept: PHYSICAL THERAPY | Facility: CLINIC | Age: 57
End: 2022-08-16

## 2022-08-16 DIAGNOSIS — R26.2 DIFFICULTY WALKING: ICD-10-CM

## 2022-08-16 DIAGNOSIS — M25.562 ACUTE PAIN OF LEFT KNEE: ICD-10-CM

## 2022-08-16 DIAGNOSIS — Z96.652 S/P TKR (TOTAL KNEE REPLACEMENT), LEFT: Primary | ICD-10-CM

## 2022-08-16 PROCEDURE — 97530 THERAPEUTIC ACTIVITIES: CPT | Performed by: PHYSICAL THERAPIST

## 2022-08-16 PROCEDURE — 97140 MANUAL THERAPY 1/> REGIONS: CPT | Performed by: PHYSICAL THERAPIST

## 2022-08-16 PROCEDURE — 97110 THERAPEUTIC EXERCISES: CPT | Performed by: PHYSICAL THERAPIST

## 2022-08-16 NOTE — PROGRESS NOTES
Physical Therapy Daily Progress Note      Patient: Alicia Melo   : 1965  Diagnosis/ICD-10 Code:  S/P TKR (total knee replacement), left [Z96.652]   Problems Addressed this Visit    None     Visit Diagnoses     S/P TKR (total knee replacement), left    -  Primary    Acute pain of left knee        Difficulty walking          Diagnoses       Codes Comments    S/P TKR (total knee replacement), left    -  Primary ICD-10-CM: Z96.652  ICD-9-CM: V43.65     Acute pain of left knee     ICD-10-CM: M25.562  ICD-9-CM: 719.46     Difficulty walking     ICD-10-CM: R26.2  ICD-9-CM: 719.7          Referring practitioner: Rigoberto Pickering MD  Date of Initial Visit: Type: THERAPY  Noted: 2022  Today's Date: 2022    VISIT#: 7    Subjective Patient reports her LLE has been swollen since going to the conference in [place]. She was on her feet or sitting for a large portion of the day and states the swelling would increase as the day progressed.  She tried to elevate, ice, and used a wheelchair but the swelling has persisted.      Objective   L knee flexion 112 degrees AROM  See Exercise, Manual, and Modality Logs for complete treatment.     Assessment/Plan Patient not wearing compression stockings today despite increased swelling due to reports of discomfort with prolonged wear.  Patient encouraged to wear at least a few hours per day and elevate often to help reduce the swelling.  Patient verbalized understanding.  Patient able to complete step ups this date without UE support but antalgia noted.  She demonstrates increased L knee flexion AROM to 112 degrees this date.      Progress per Plan of Care and Progress strengthening /stabilization /functional activity         Timed:         Manual Therapy:    10     mins  07431;     Therapeutic Exercise:    18     mins  07129;     Neuromuscular Adrienne:        mins  78984;    Therapeutic Activity:     12     mins  02164;     Gait Training:           mins   81433;     Ultrasound:          mins  54916;    Ionto                                   mins   28212    Un-Timed:  Electrical Stimulation:         mins  43012 ( );  Dry Needling          mins self-pay 20560; 20561  Traction          mins 75665      Timed Treatment:   40   mins   Total Treatment:     50   mins    Angi Palmer PT  [place] License # [id number]  Physical Therapist

## 2022-08-18 ENCOUNTER — TREATMENT (OUTPATIENT)
Dept: PHYSICAL THERAPY | Facility: CLINIC | Age: 57
End: 2022-08-18

## 2022-08-18 DIAGNOSIS — R26.2 DIFFICULTY WALKING: ICD-10-CM

## 2022-08-18 DIAGNOSIS — M25.562 ACUTE PAIN OF LEFT KNEE: ICD-10-CM

## 2022-08-18 DIAGNOSIS — Z96.652 S/P TKR (TOTAL KNEE REPLACEMENT), LEFT: Primary | ICD-10-CM

## 2022-08-18 PROCEDURE — 97110 THERAPEUTIC EXERCISES: CPT | Performed by: PHYSICAL THERAPIST

## 2022-08-18 PROCEDURE — 97530 THERAPEUTIC ACTIVITIES: CPT | Performed by: PHYSICAL THERAPIST

## 2022-08-18 NOTE — PROGRESS NOTES
Physical Therapy Daily Progress Note      Patient: Alicia Melo   : 1965  Diagnosis/ICD-10 Code:  S/P TKR (total knee replacement), left [Z96.652]   Problems Addressed this Visit    None     Visit Diagnoses     S/P TKR (total knee replacement), left    -  Primary    Acute pain of left knee        Difficulty walking          Diagnoses       Codes Comments    S/P TKR (total knee replacement), left    -  Primary ICD-10-CM: Z96.652  ICD-9-CM: V43.65     Acute pain of left knee     ICD-10-CM: M25.562  ICD-9-CM: 719.46     Difficulty walking     ICD-10-CM: R26.2  ICD-9-CM: 719.7          Referring practitioner: Rigoberto Pickering MD  Date of Initial Visit: Type: THERAPY  Noted: 2022  Today's Date: 2022    VISIT#: 8    Subjective Patient reports working very hard the past two days on reducing swelling in her LLE.  She has been elevating her LE at night and wearing her compression stockings.      Objective     See Exercise, Manual, and Modality Logs for complete treatment.     Assessment/Plan Patient continues to demonstrate LLE antalgia with step ups but she was able to complete without UE support.  Added single LE leg press with patient reporting no pain but stiffness of L knee joint noted.  Patient encouraged to continue with ice, elevation, and use of compression stockings for continued improvements in reducing swelling.  Patient verbalized understanding.    Progress per Plan of Care and Progress strengthening /stabilization /functional activity         Timed:         Manual Therapy:         mins  86487;     Therapeutic Exercise:    25     mins  23879;     Neuromuscular Adrienne:        mins  77693;    Therapeutic Activity:     15     mins  40618;     Gait Training:           mins  92595;     Ultrasound:          mins  10056;    Ionto                                   mins   90446    Un-Timed:  Electrical Stimulation:         mins  80796 ( );  Dry Needling          mins self-pay 63974;  88220  [facility] [id number]      Timed Treatment:   40   mins   Total Treatment:     50   mins    Angi Palmer PT  [place] License # [id number]  Physical Therapist

## 2022-08-25 ENCOUNTER — TREATMENT (OUTPATIENT)
Dept: PHYSICAL THERAPY | Facility: CLINIC | Age: 57
End: 2022-08-25

## 2022-08-25 DIAGNOSIS — Z96.652 S/P TKR (TOTAL KNEE REPLACEMENT), LEFT: Primary | ICD-10-CM

## 2022-08-25 DIAGNOSIS — R26.2 DIFFICULTY WALKING: ICD-10-CM

## 2022-08-25 DIAGNOSIS — M25.562 ACUTE PAIN OF LEFT KNEE: ICD-10-CM

## 2022-08-25 PROCEDURE — 97530 THERAPEUTIC ACTIVITIES: CPT | Performed by: PHYSICAL THERAPIST

## 2022-08-25 PROCEDURE — 97110 THERAPEUTIC EXERCISES: CPT | Performed by: PHYSICAL THERAPIST

## 2022-08-25 NOTE — PROGRESS NOTES
Physical Therapy Daily Progress Note      Patient: Alicia Melo   : 1965  Diagnosis/ICD-10 Code:  S/P TKR (total knee replacement), left [Z96.652]   Problems Addressed this Visit    None     Visit Diagnoses     S/P TKR (total knee replacement), left    -  Primary    Acute pain of left knee        Difficulty walking          Diagnoses       Codes Comments    S/P TKR (total knee replacement), left    -  Primary ICD-10-CM: Z96.652  ICD-9-CM: V43.65     Acute pain of left knee     ICD-10-CM: M25.562  ICD-9-CM: 719.46     Difficulty walking     ICD-10-CM: R26.2  ICD-9-CM: 719.7          Referring practitioner: Rigoberto Pickering MD  Date of Initial Visit: Type: THERAPY  Noted: 2022  Today's Date: 2022    VISIT#: 9    Subjective Patient reports she was on her feet for ~30 minutes on ,  and had increased swelling into her LLE.  She elevated her foot and has been taking it easy after work so her swelling has improved.      Objective     See Exercise, Manual, and Modality Logs for complete treatment.     Assessment/Plan Patient with increased L knee flexion to 115 degrees in supine this date.  She demonstrates decreased LLE antalgia with step ups and decreased weight bearing through her L LE with squats.  Added repeated sit to stands with emphasis on equal weight bearing for improved distal quads strengthening of L LE.    Progress per Plan of Care and Progress strengthening /stabilization /functional activity         Timed:         Manual Therapy:         mins  10645;     Therapeutic Exercise:    30     mins  88950;     Neuromuscular Adrienne:        mins  09009;    Therapeutic Activity:     10     mins  56276;     Gait Training:           mins  32091;     Ultrasound:          mins  32684;    Ionto                                   mins   60404    Un-Timed:  Electrical Stimulation:         mins  66345 ( );  Dry Needling          mins self-pay ;   Traction           mins 91768      Timed Treatment:   40   mins   Total Treatment:     50   mins    Angi Palmer PT  [place] License # [id number]  Physical Therapist

## 2022-08-30 ENCOUNTER — TREATMENT (OUTPATIENT)
Dept: PHYSICAL THERAPY | Facility: CLINIC | Age: 57
End: 2022-08-30

## 2022-08-30 DIAGNOSIS — R26.2 DIFFICULTY WALKING: ICD-10-CM

## 2022-08-30 DIAGNOSIS — Z96.652 S/P TKR (TOTAL KNEE REPLACEMENT), LEFT: Primary | ICD-10-CM

## 2022-08-30 DIAGNOSIS — M25.562 ACUTE PAIN OF LEFT KNEE: ICD-10-CM

## 2022-08-30 PROCEDURE — 97530 THERAPEUTIC ACTIVITIES: CPT | Performed by: PHYSICAL THERAPIST

## 2022-08-30 PROCEDURE — 97110 THERAPEUTIC EXERCISES: CPT | Performed by: PHYSICAL THERAPIST

## 2022-08-30 NOTE — PROGRESS NOTES
Physical Therapy Daily Progress Note      Patient: Alicia Melo   : 1965  Diagnosis/ICD-10 Code:  S/P TKR (total knee replacement), left [Z96.652]   Problems Addressed this Visit    None     Visit Diagnoses     S/P TKR (total knee replacement), left    -  Primary    Acute pain of left knee        Difficulty walking          Diagnoses       Codes Comments    S/P TKR (total knee replacement), left    -  Primary ICD-10-CM: Z96.652  ICD-9-CM: V43.65     Acute pain of left knee     ICD-10-CM: M25.562  ICD-9-CM: 719.46     Difficulty walking     ICD-10-CM: R26.2  ICD-9-CM: 719.7          Referring practitioner: Rigoberto Pickering MD  Date of Initial Visit: Type: THERAPY  Noted: 2022  Today's Date: 2022    VISIT#: 10    Subjective Patient reports her L gastroc continues to feel sore.  She reports her knee and leg continue to swell after being on her feet and it feels stiff after sitting for 20 minutes or longer.      Objective     See Exercise, Manual, and Modality Logs for complete treatment.     Assessment/Plan Patient continues to have difficulty with donning socks/shoes.  Added seated piriformis stretch, hip abduction with external rotation, and rotational step ups.  Patient continues to demonstrate compensations with step up and downs with increased contralateral push off noted.  Patient encouraged to perform forward weight shift onto LLE prior to step up for improved weight acceptance and decreased LLE antalgia.  Improved technique noted with verbal cueing and demonstration.    Progress per Plan of Care and Progress strengthening /stabilization /functional activity         Timed:         Manual Therapy:     5    mins  61734;     Therapeutic Exercise:    25     mins  57901;     Neuromuscular Adrienne:        mins  17052;    Therapeutic Activity:    15      mins  56995;     Gait Training:           mins  25740;     Ultrasound:          mins  17933;    Ionto                                    mins   04398    Un-Timed:  Electrical Stimulation:         mins  40157 ( );  Dry Needling          mins self-pay 20560; 20561  Traction          mins 09339      Timed Treatment:   45   mins   Total Treatment:     55   mins    Angi Palmer PT  [place] License # [id number]  Physical Therapist

## 2022-09-01 ENCOUNTER — TREATMENT (OUTPATIENT)
Dept: PHYSICAL THERAPY | Facility: CLINIC | Age: 57
End: 2022-09-01

## 2022-09-01 DIAGNOSIS — Z96.652 S/P TKR (TOTAL KNEE REPLACEMENT), LEFT: Primary | ICD-10-CM

## 2022-09-01 DIAGNOSIS — M25.562 ACUTE PAIN OF LEFT KNEE: ICD-10-CM

## 2022-09-01 DIAGNOSIS — R26.2 DIFFICULTY WALKING: ICD-10-CM

## 2022-09-01 PROCEDURE — 97140 MANUAL THERAPY 1/> REGIONS: CPT | Performed by: PHYSICAL THERAPIST

## 2022-09-01 PROCEDURE — 97110 THERAPEUTIC EXERCISES: CPT | Performed by: PHYSICAL THERAPIST

## 2022-09-01 PROCEDURE — 97530 THERAPEUTIC ACTIVITIES: CPT | Performed by: PHYSICAL THERAPIST

## 2022-09-01 NOTE — PROGRESS NOTES
"     Physical Therapy Daily Progress Note      Patient: Alicia Melo   : 1965  Diagnosis/ICD-10 Code:  S/P TKR (total knee replacement), left [Z96.652]   Problems Addressed this Visit    None     Visit Diagnoses     S/P TKR (total knee replacement), left    -  Primary    Acute pain of left knee        Difficulty walking          Diagnoses       Codes Comments    S/P TKR (total knee replacement), left    -  Primary ICD-10-CM: Z96.652  ICD-9-CM: V43.65     Acute pain of left knee     ICD-10-CM: M25.562  ICD-9-CM: 719.46     Difficulty walking     ICD-10-CM: R26.2  ICD-9-CM: 719.7          Referring practitioner: Rigoberto Pickering MD  Date of Initial Visit: Type: THERAPY  Noted: 2022  Today's Date: 2022    VISIT#: 11    Subjective Patient continues to report soreness and cramping in her L calf.  She has continued welling into L calf, knee, and ankle but states she has been icing/elevating frequently throughout the day.      Objective     See Exercise, Manual, and Modality Logs for complete treatment.     Assessment/Plan Continued with IASTM to L calf followed by stretching and use of heat with decreased pain and stiffness noted after.  Patient encouraged to drink plenty of water to minimize muscle cramping/spasms in L calf.  Patient verbalized understanding. Patient able to complete step downs on 6\" step this date but continues to demonstrate quick weight shift from LLE due to complaints of soreness and stiffness.  Patient would continue to benefit from strengthening of L quads and hamstrings for improved gait mechanics and ability to complete functional activities.    Progress per Plan of Care and Progress strengthening /stabilization /functional activity         Timed:         Manual Therapy:    12     mins  21887;     Therapeutic Exercise:    18     mins  06000;     Neuromuscular Adrienne:        mins  26308;    Therapeutic Activity:     13     mins  86444;     Gait Training:           mins  " 26766;     Ultrasound:          mins  56612;    Ionto                                   mins   70250    Un-Timed:  Electrical Stimulation:         mins  57872 ( );  Dry Needling          mins self-pay 20560; 20561  Traction          mins 00381      Timed Treatment:   43   mins   Total Treatment:     53   mins    Angi Palmer PT  [place] License # [id number]  Physical Therapist

## 2022-09-08 ENCOUNTER — TREATMENT (OUTPATIENT)
Dept: PHYSICAL THERAPY | Facility: CLINIC | Age: 57
End: 2022-09-08

## 2022-09-08 DIAGNOSIS — Z96.652 S/P TKR (TOTAL KNEE REPLACEMENT), LEFT: Primary | ICD-10-CM

## 2022-09-08 DIAGNOSIS — R26.2 DIFFICULTY WALKING: ICD-10-CM

## 2022-09-08 DIAGNOSIS — M25.562 ACUTE PAIN OF LEFT KNEE: ICD-10-CM

## 2022-09-08 PROCEDURE — 97140 MANUAL THERAPY 1/> REGIONS: CPT | Performed by: PHYSICAL THERAPIST

## 2022-09-08 PROCEDURE — 97110 THERAPEUTIC EXERCISES: CPT | Performed by: PHYSICAL THERAPIST

## 2022-09-08 PROCEDURE — 97530 THERAPEUTIC ACTIVITIES: CPT | Performed by: PHYSICAL THERAPIST

## 2022-09-08 NOTE — PROGRESS NOTES
Physical Therapy Daily Progress Note      Patient: Alicia Melo   : 1965  Diagnosis/ICD-10 Code:  S/P TKR (total knee replacement), left [Z96.652]   Problems Addressed this Visit    None     Visit Diagnoses     S/P TKR (total knee replacement), left    -  Primary    Acute pain of left knee        Difficulty walking          Diagnoses       Codes Comments    S/P TKR (total knee replacement), left    -  Primary ICD-10-CM: Z96.652  ICD-9-CM: V43.65     Acute pain of left knee     ICD-10-CM: M25.562  ICD-9-CM: 719.46     Difficulty walking     ICD-10-CM: R26.2  ICD-9-CM: 719.7          Referring practitioner: Rigoberto Pickering MD  Date of Initial Visit: Type: THERAPY  Noted: 2022  Today's Date: 2022    VISIT#: 12    Subjective Patient had follow up with surgeon yesterday and he is pleased with her ROM.  She asked about the Baker's cyst which was not removed during surgery but rather drained.        Objective   L knee AROM 0-3-116 degrees  See Exercise, Manual, and Modality Logs for complete treatment.     Assessment/Plan Patient able to complete step up and overs without use of UEs this date but antalgia continues to be noted.  Muscle guarding also noted at L lateral gastroc during IASTM this date but decreased tenderness reported.  She was able to complete full revolutions on the recumbent bike this date.  Continue to progress strengthening, ROM, and endurance as tolerated.    Progress per Plan of Care and Progress strengthening /stabilization /functional activity         Timed:         Manual Therapy:    9     mins  92448;     Therapeutic Exercise:    18     mins  34042;     Neuromuscular Adrienne:        mins  41635;    Therapeutic Activity:     16     mins  83100;     Gait Training:           mins  41311;     Ultrasound:          mins  86898;    Ionto                                   mins   65771    Un-Timed:  Electrical Stimulation:         mins  61814 ( );  Dry Needling           mins self-pay 20560; 20561  Replaced by [facility]          mins 74351      Timed Treatment:   43   mins   Total Treatment:     43   mins    Angi Palmer PT  [place] License # [id number]  Physical Therapist

## 2022-09-20 ENCOUNTER — TREATMENT (OUTPATIENT)
Dept: PHYSICAL THERAPY | Facility: CLINIC | Age: 57
End: 2022-09-20

## 2022-09-20 DIAGNOSIS — R26.2 DIFFICULTY WALKING: ICD-10-CM

## 2022-09-20 DIAGNOSIS — M25.562 ACUTE PAIN OF LEFT KNEE: ICD-10-CM

## 2022-09-20 DIAGNOSIS — Z96.652 S/P TKR (TOTAL KNEE REPLACEMENT), LEFT: Primary | ICD-10-CM

## 2022-09-20 PROCEDURE — 97530 THERAPEUTIC ACTIVITIES: CPT | Performed by: PHYSICAL THERAPIST

## 2022-09-20 PROCEDURE — 97140 MANUAL THERAPY 1/> REGIONS: CPT | Performed by: PHYSICAL THERAPIST

## 2022-09-20 PROCEDURE — 97110 THERAPEUTIC EXERCISES: CPT | Performed by: PHYSICAL THERAPIST

## 2022-09-20 NOTE — PROGRESS NOTES
Physical Therapy Daily Progress Note      Patient: Alicia Melo   : 1965  Diagnosis/ICD-10 Code:  S/P TKR (total knee replacement), left [Z96.652]   Problems Addressed this Visit    None     Visit Diagnoses     S/P TKR (total knee replacement), left    -  Primary    Acute pain of left knee        Difficulty walking          Diagnoses       Codes Comments    S/P TKR (total knee replacement), left    -  Primary ICD-10-CM: Z96.652  ICD-9-CM: V43.65     Acute pain of left knee     ICD-10-CM: M25.562  ICD-9-CM: 719.46     Difficulty walking     ICD-10-CM: R26.2  ICD-9-CM: 719.7          Referring practitioner: Rigoberto Pickering MD  Date of Initial Visit: Type: THERAPY  Noted: 2022  Today's Date: 2022    VISIT#: 13    Subjective Patient reports she has been consistently performing her HEP every morning.  She continues to report soreness, stiffness, and swelling into L knee and ankle.      Objective     See Exercise, Manual, and Modality Logs for complete treatment.     Assessment/Plan Progressed ROM with increased PROM into overpressure performed in prone this date.  She was able to perform L knee flexion AROM to 117 degrees this date in supine.  Also progressed strengthening with increased squat and lunge depth for improved ability to complete functional activities such as transitions and getting up/down from the floor.      Progress per Plan of Care and Progress strengthening /stabilization /functional activity         Timed:         Manual Therapy:    9     mins  45766;     Therapeutic Exercise:    15     mins  93437;     Neuromuscular Adrienne:        mins  97496;    Therapeutic Activity:     14     mins  44467;     Gait Training:           mins  98604;     Ultrasound:          mins  88553;    Ionto                                   mins   00739    Un-Timed:  Electrical Stimulation:         mins  67179 ( );  Dry Needling          mins self-pay ;   Traction          mins  90529      Timed Treatment:   38   mins   Total Treatment:     38   mins    Angi Palmer PT  [place] License # [id number]  Physical Therapist

## 2022-09-22 ENCOUNTER — TREATMENT (OUTPATIENT)
Dept: PHYSICAL THERAPY | Facility: CLINIC | Age: 57
End: 2022-09-22

## 2022-09-22 DIAGNOSIS — M25.562 ACUTE PAIN OF LEFT KNEE: ICD-10-CM

## 2022-09-22 DIAGNOSIS — Z96.652 S/P TKR (TOTAL KNEE REPLACEMENT), LEFT: Primary | ICD-10-CM

## 2022-09-22 DIAGNOSIS — R26.2 DIFFICULTY WALKING: ICD-10-CM

## 2022-09-22 PROCEDURE — 97110 THERAPEUTIC EXERCISES: CPT | Performed by: PHYSICAL THERAPIST

## 2022-09-22 PROCEDURE — 97140 MANUAL THERAPY 1/> REGIONS: CPT | Performed by: PHYSICAL THERAPIST

## 2022-09-22 PROCEDURE — 97530 THERAPEUTIC ACTIVITIES: CPT | Performed by: PHYSICAL THERAPIST

## 2022-09-22 NOTE — PROGRESS NOTES
Physical Therapy Daily Progress Note      Patient: Alicia Melo   : 1965  Diagnosis/ICD-10 Code:  S/P TKR (total knee replacement), left [Z96.652]  Referring practitioner: Rigoberto Pickering MD  Date of Initial Visit: Type: THERAPY  Noted: 2022  Today's Date: 2022  Patient seen for 14 sessions         Alicia Melo reports: she is stiff this morning and that is every morning as well. Pt. States the soreness in her knee lingers and she still struggles to do stairs reciprocally.    Objective   See Exercise, Manual, and Modality Logs for complete treatment.     Assessment/Plan  Pt. responds well to exercise but seems overall discouraged at the words of her MD stating she should be further along. Education was provided this date on distal quad strength and efficient performance of exercises for best return of strength and function. Pt. Verbalizes understanding.    Goals  Plan Goals: STG to be met in 3 wk:  - Increase L knee AROM to 0-110 deg.  - Pt to ambulate without assistive devide safely in clinic with good gait pattern.  - Pt to be independent with HEP.  LTG to be met in 12 wk:  - Improve Oxford Knee to 15% or less impairment and LEFS to 80% ability.  - Increase L knee strength to 4+/5 or greater for improved stair climbing.  - Ambulation to be returned to PLOF.    Progress strengthening /stabilization /functional activity           Timed:         Manual Therapy:    10     mins  78432;     Therapeutic Exercise:    15     mins  85575;     Therapeutic Activity:     15     mins  08657;       Timed Treatment:   40   mins   Total Treatment:     50   mins    Emelina Ye PTA  Physical Therapist Assistant License #[id number]

## 2022-09-27 ENCOUNTER — TREATMENT (OUTPATIENT)
Dept: PHYSICAL THERAPY | Facility: CLINIC | Age: 57
End: 2022-09-27

## 2022-09-27 DIAGNOSIS — R26.2 DIFFICULTY WALKING: ICD-10-CM

## 2022-09-27 DIAGNOSIS — Z96.652 S/P TKR (TOTAL KNEE REPLACEMENT), LEFT: Primary | ICD-10-CM

## 2022-09-27 DIAGNOSIS — M25.562 ACUTE PAIN OF LEFT KNEE: ICD-10-CM

## 2022-09-27 PROCEDURE — 97140 MANUAL THERAPY 1/> REGIONS: CPT | Performed by: PHYSICAL THERAPIST

## 2022-09-27 PROCEDURE — 97530 THERAPEUTIC ACTIVITIES: CPT | Performed by: PHYSICAL THERAPIST

## 2022-09-27 PROCEDURE — 97110 THERAPEUTIC EXERCISES: CPT | Performed by: PHYSICAL THERAPIST

## 2022-09-27 NOTE — PROGRESS NOTES
Physical Therapy Daily Progress Note      Patient: Alicia Melo   : 1965  Diagnosis/ICD-10 Code:  S/P TKR (total knee replacement), left [Z96.652]  Referring practitioner: Rigoberto Pickering MD  Date of Initial Visit: Type: THERAPY  Noted: 2022  Today's Date: 2022  Patient seen for 15 sessions         Alicia Melo reports: she had her knee inflamed and irritated this weekend but states she knew it would be after all of the walking she had done specifically form [place] and then from [place] on Saturday night. Pt. states she has been doing her reverse step ups on a small step at home and stairs in general continue to be her most difficult exercise.     Objective   See Exercise, Manual, and Modality Logs for complete treatment.     Assessment/Plan   Pt. Tolerates treatment well this date showing increased tolerance to manual flexion with overpressure as well as prone stretching into knee flexion with strap for self. Pt. Is achieving quad stretch during these now rather than having knee pain. Pt is encouraged to remain consistent with her HEP for best results.    Goals  Plan Goals: STG to be met in 3 wk:  - Increase L knee AROM to 0-110 deg.  - Pt to ambulate without assistive devide safely in clinic with good gait pattern.  - Pt to be independent with HEP.  LTG to be met in 12 wk:  - Improve Oxford Knee to 15% or less impairment and LEFS to 80% ability.  - Increase L knee strength to 4+/5 or greater for improved stair climbing.  - Ambulation to be returned to [facility].    Progress strengthening /stabilization /functional activity           Timed:         Manual Therapy:    10     mins  88882;     Therapeutic Exercise:    15     mins  59042;     Therapeutic Activity:     15     mins  21766;         Timed Treatment:   40   mins   Total Treatment:     50   mins    Emelina Ye PTA  Physical Therapist Assistant License #[id number]

## 2022-10-06 ENCOUNTER — TREATMENT (OUTPATIENT)
Dept: PHYSICAL THERAPY | Facility: CLINIC | Age: 57
End: 2022-10-06

## 2022-10-06 DIAGNOSIS — Z96.652 S/P TKR (TOTAL KNEE REPLACEMENT), LEFT: Primary | ICD-10-CM

## 2022-10-06 DIAGNOSIS — M25.562 ACUTE PAIN OF LEFT KNEE: ICD-10-CM

## 2022-10-06 DIAGNOSIS — R26.2 DIFFICULTY WALKING: ICD-10-CM

## 2022-10-06 PROCEDURE — 97112 NEUROMUSCULAR REEDUCATION: CPT | Performed by: PHYSICAL THERAPIST

## 2022-10-06 PROCEDURE — 97530 THERAPEUTIC ACTIVITIES: CPT | Performed by: PHYSICAL THERAPIST

## 2022-10-06 PROCEDURE — 97110 THERAPEUTIC EXERCISES: CPT | Performed by: PHYSICAL THERAPIST

## 2022-10-06 NOTE — PROGRESS NOTES
Physical Therapy Daily Progress Note      Patient: Alicia Melo   : 1965  Diagnosis/ICD-10 Code:  S/P TKR (total knee replacement), left [Z96.652]   Problems Addressed this Visit    None     Visit Diagnoses     S/P TKR (total knee replacement), left    -  Primary    Acute pain of left knee        Difficulty walking          Diagnoses       Codes Comments    S/P TKR (total knee replacement), left    -  Primary ICD-10-CM: Z96.652  ICD-9-CM: V43.65     Acute pain of left knee     ICD-10-CM: M25.562  ICD-9-CM: 719.46     Difficulty walking     ICD-10-CM: R26.2  ICD-9-CM: 719.7          Referring practitioner: Rigoberto Pickering MD  Date of Initial Visit: Type: THERAPY  Noted: 2022  Today's Date: 10/6/2022    VISIT#: 16    Subjective Patient reports her knee has been sore and swollen following a convention earlier this week.  Patient states she iced her knee for a couple hours which helped the swelling improve.      Objective     See Exercise, Manual, and Modality Logs for complete treatment.     Assessment/Plan Progressed quad strengthening with addition of step ups onto treadmill (approximately 8 inches) with mild LLE antalgia noted and RLE push off but improved with repetition.  She demonstrates improved gait mechanics with mild LLE antalgia noted and slight decreased TKE with heel strike.  Progress discussed with patient.  Plan for patient to attend 3 additional visits and then d/c with HEP.    Progress per Plan of Care and Progress strengthening /stabilization /functional activity         Timed:         Manual Therapy:         mins  78421;     Therapeutic Exercise:    18     mins  28370;     Neuromuscular Adrienne:    12    mins  62909;    Therapeutic Activity:     16     mins  62183;     Gait Training:           mins  58905;     Ultrasound:          mins  38141;    Ionto                                   mins   74902    Un-Timed:  Electrical Stimulation:         mins  95274 ( );  Dry  Maya          mins self-pay 20560; 20561  Traction          mins 73303      Timed Treatment:   46   mins   Total Treatment:     46   mins    Angi Palmer PT  [place] License # [id number]  Physical Therapist

## 2022-10-11 ENCOUNTER — TREATMENT (OUTPATIENT)
Dept: PHYSICAL THERAPY | Facility: CLINIC | Age: 57
End: 2022-10-11

## 2022-10-11 DIAGNOSIS — Z96.652 S/P TKR (TOTAL KNEE REPLACEMENT), LEFT: Primary | ICD-10-CM

## 2022-10-11 DIAGNOSIS — M25.562 ACUTE PAIN OF LEFT KNEE: ICD-10-CM

## 2022-10-11 DIAGNOSIS — R26.2 DIFFICULTY WALKING: ICD-10-CM

## 2022-10-11 PROCEDURE — 97140 MANUAL THERAPY 1/> REGIONS: CPT | Performed by: PHYSICAL THERAPIST

## 2022-10-11 PROCEDURE — 97110 THERAPEUTIC EXERCISES: CPT | Performed by: PHYSICAL THERAPIST

## 2022-10-19 ENCOUNTER — OFFICE (AMBULATORY)
Dept: RURAL CLINIC 3 | Facility: CLINIC | Age: 57
End: 2022-10-19
Payer: COMMERCIAL

## 2022-10-19 VITALS
WEIGHT: 212 LBS | SYSTOLIC BLOOD PRESSURE: 130 MMHG | HEIGHT: 64 IN | HEART RATE: 83 BPM | DIASTOLIC BLOOD PRESSURE: 84 MMHG

## 2022-10-19 DIAGNOSIS — K21.9 GASTRO-ESOPHAGEAL REFLUX DISEASE WITHOUT ESOPHAGITIS: ICD-10-CM

## 2022-10-19 DIAGNOSIS — R13.10 DYSPHAGIA, UNSPECIFIED: ICD-10-CM

## 2022-10-19 PROCEDURE — 99202 OFFICE O/P NEW SF 15 MIN: CPT | Performed by: INTERNAL MEDICINE

## 2022-10-19 RX ORDER — PANTOPRAZOLE SODIUM 40 MG/1
40 TABLET, DELAYED RELEASE ORAL
Qty: 30 | Refills: 11 | Status: COMPLETED
Start: 2022-10-19 | End: 2022-11-03

## 2022-10-21 ENCOUNTER — TREATMENT (OUTPATIENT)
Dept: PHYSICAL THERAPY | Facility: CLINIC | Age: 57
End: 2022-10-21

## 2022-10-21 DIAGNOSIS — M25.562 ACUTE PAIN OF LEFT KNEE: ICD-10-CM

## 2022-10-21 DIAGNOSIS — R26.2 DIFFICULTY WALKING: ICD-10-CM

## 2022-10-21 DIAGNOSIS — Z96.652 S/P TKR (TOTAL KNEE REPLACEMENT), LEFT: Primary | ICD-10-CM

## 2022-10-21 PROCEDURE — 97110 THERAPEUTIC EXERCISES: CPT | Performed by: PHYSICAL THERAPIST

## 2022-10-21 PROCEDURE — 97140 MANUAL THERAPY 1/> REGIONS: CPT | Performed by: PHYSICAL THERAPIST

## 2022-10-21 PROCEDURE — 97112 NEUROMUSCULAR REEDUCATION: CPT | Performed by: PHYSICAL THERAPIST

## 2022-10-21 NOTE — PROGRESS NOTES
Physical Therapy Daily Treatment Note      Patient: Alicia Melo   : 1965  Diagnosis/ICD-10 Code:  S/P TKR (total knee replacement), left [Z96.652]  Referring practitioner: Rigoberto Pickering MD  Date of Initial Visit: Type: THERAPY  Noted: 2022  Today's Date: 10/21/2022  Patient seen for 18 sessions         Alicia Melo reports: she continues to exercise daily and stretches to the best of her ability but states she continues to combat swelling at this time. Pt. States she is somewhat disappointed int he lack of increase in flexion however reports her overall function has improved.    Objective   See Exercise, Manual, and Modality Logs for complete treatment.     Access Code: [email or web address]  URL: [email or web address]  Date: 10/21/2022  Prepared by: Emelina Ye    Exercises  Supine Quadriceps Stretch with Strap on Table - 2 x daily - 7 x weekly - 1 sets - 3 reps - 30 sec hold    Assessment/Plan  Pt. Shows progressive functional gains and is shows more confidence in step downs and reverse step ups at this time. Pt. Will benefit from continued strengthening for improving functional range. Pt. Was encouraged to keep up daily stretching to improve mobility.    Progress toward previous goals: Partially Met  STG to be met in 3 wk:  - Increase L knee AROM to 0-110 deg. MET  - Pt to ambulate without assistive devide safely in clinic with good gait pattern. MET   - Pt to be independent with HEP. MET   LTG to be met in 12 wk:  - Improve Oxford Knee to 15% or less impairment and LEFS to 80% ability. NOT ASSESSED TODAY    - Increase L knee strength to 4+/5 or greater for improved stair climbing. MET         - Ambulation to be returned to PLOF. NOT MET, progressing    Progress strengthening /stabilization /functional activity           Timed:         Manual Therapy:    15     mins  33107;     Therapeutic Exercise:    15     mins  60534;     Neuromuscular Adrienne:    10    mins  93120;      10 mins of  ice    Timed Treatment:   40   mins   Total Treatment:     40   mins    Emelina Ye PTA  Physical Therapist Assistant License #[id number]

## 2022-10-25 ENCOUNTER — TREATMENT (OUTPATIENT)
Dept: PHYSICAL THERAPY | Facility: CLINIC | Age: 57
End: 2022-10-25

## 2022-10-25 DIAGNOSIS — Z96.652 S/P TKR (TOTAL KNEE REPLACEMENT), LEFT: Primary | ICD-10-CM

## 2022-10-25 DIAGNOSIS — R26.2 DIFFICULTY WALKING: ICD-10-CM

## 2022-10-25 DIAGNOSIS — M25.562 ACUTE PAIN OF LEFT KNEE: ICD-10-CM

## 2022-10-25 PROCEDURE — 97140 MANUAL THERAPY 1/> REGIONS: CPT | Performed by: PHYSICAL THERAPIST

## 2022-10-25 PROCEDURE — 97530 THERAPEUTIC ACTIVITIES: CPT | Performed by: PHYSICAL THERAPIST

## 2022-10-25 PROCEDURE — 97110 THERAPEUTIC EXERCISES: CPT | Performed by: PHYSICAL THERAPIST

## 2022-10-25 NOTE — PROGRESS NOTES
Physical Therapy Progress Note/Reassessment  [address] [place] [address] [address]   [place] [address]    Patient: Alicia Melo   : 1965  Diagnosis/ICD-10 Code:  S/P TKR (total knee replacement), left [Z96.652]  Referring practitioner: Rigoberto Pickering MD  Date of Initial Evaluation:  Type: THERAPY  Noted: 2022  Patient seen for 19 sessions      Subjective:   Visit Diagnoses:    ICD-10-CM ICD-9-CM   1. S/P TKR (total knee replacement), left  Z96.652 V43.65   2. Acute pain of left knee  M25.562 719.46   3. Difficulty walking  R26.2 719.7         Alicia Melo reports her knee is stiff and sore into this morning and yesterday she states her knee and leg felt like it was in knots. Pt. States she went to a chiropractic visit and the chiropractor did not want to do any adjustments due to the muscle tightness and he performed manual techniques to the musculature to relieve the tension.    Subjective Questionnaire: Oxford Knee 35% disability LEFS 65% ability  Clinical Progress: improved  Home Program Compliance: Yes  Treatment has included: therapeutic exercise, neuromuscular re-education, manual therapy and therapeutic activity    Objective   L Knee MMT 5/5 grossly  L Knee AROM (measured supine into heel slide) Flex 0-110 Ext 0 degrees  L Knee PROM (measured seated EOB) 0-118 deg (measured supine heel slide) 0-115 deg    Assessment/Plan  Pt. ROM and strength is measurably increased at this time and Pt. Has completed all but one goal set at evaluation. Pt. Continues to return to PLOF and shows great understanding and compliance to HEP at this time. Pt. Is appropriate for discharge base don progress and independence with HEP however Pt. Is encouraged to return should any concerns arise.    STG to be met in 3 wk:  - Increase L knee AROM to 0-110 deg. MET  - Pt to ambulate without assistive devide safely in clinic with good gait pattern. MET   - Pt to be independent with HEP. MET   LTG to be met in 12 wk:  -  Improve Oxford Knee to 15% or less impairment and LEFS to 80% ability. NOT MET  - Increase L knee strength to 4+/5 or greater for improved stair climbing. MET         - Ambulation to be returned to [facility]. MET     Recommendations: Discharge  Prognosis to achieve goals: good      Timed:         Manual Therapy:    15     mins  26667;     Therapeutic Exercise:    15     mins  41700;     Therapeutic Activity:     15     mins  20301;       10 mins of ice    Timed Treatment:   45   mins   Total Treatment:     45   mins    PT Signature: Emelina Ye PTA  [place] License: #[id number]

## 2022-11-04 ENCOUNTER — OFFICE (AMBULATORY)
Dept: URBAN - METROPOLITAN AREA PATHOLOGY 4 | Facility: PATHOLOGY | Age: 57
End: 2022-11-04
Payer: COMMERCIAL

## 2022-11-04 ENCOUNTER — ON CAMPUS - OUTPATIENT (AMBULATORY)
Dept: URBAN - METROPOLITAN AREA HOSPITAL 2 | Facility: HOSPITAL | Age: 57
End: 2022-11-04
Payer: COMMERCIAL

## 2022-11-04 VITALS
OXYGEN SATURATION: 98 % | HEART RATE: 74 BPM | DIASTOLIC BLOOD PRESSURE: 87 MMHG | SYSTOLIC BLOOD PRESSURE: 143 MMHG | RESPIRATION RATE: 16 BRPM | HEIGHT: 64 IN | HEART RATE: 75 BPM | DIASTOLIC BLOOD PRESSURE: 82 MMHG | RESPIRATION RATE: 17 BRPM | SYSTOLIC BLOOD PRESSURE: 129 MMHG | DIASTOLIC BLOOD PRESSURE: 72 MMHG | OXYGEN SATURATION: 99 % | WEIGHT: 213 LBS | HEART RATE: 64 BPM | DIASTOLIC BLOOD PRESSURE: 91 MMHG | TEMPERATURE: 97.3 F | SYSTOLIC BLOOD PRESSURE: 135 MMHG | HEART RATE: 71 BPM | HEART RATE: 81 BPM | SYSTOLIC BLOOD PRESSURE: 112 MMHG | SYSTOLIC BLOOD PRESSURE: 168 MMHG | OXYGEN SATURATION: 97 %

## 2022-11-04 DIAGNOSIS — R13.10 DYSPHAGIA, UNSPECIFIED: ICD-10-CM

## 2022-11-04 DIAGNOSIS — K22.2 ESOPHAGEAL OBSTRUCTION: ICD-10-CM

## 2022-11-04 DIAGNOSIS — K31.89 OTHER DISEASES OF STOMACH AND DUODENUM: ICD-10-CM

## 2022-11-04 DIAGNOSIS — K21.00 GASTRO-ESOPHAGEAL REFLUX DISEASE WITH ESOPHAGITIS, WITHOUT B: ICD-10-CM

## 2022-11-04 DIAGNOSIS — K25.9 GASTRIC ULCER, UNSPECIFIED AS ACUTE OR CHRONIC, WITHOUT HEMO: ICD-10-CM

## 2022-11-04 PROBLEM — K20.80 OTHER ESOPHAGITIS WITHOUT BLEEDING: Status: ACTIVE | Noted: 2022-11-04

## 2022-11-04 LAB
GI HISTOLOGY: A. SELECT: (no result)
GI HISTOLOGY: PDF REPORT: (no result)

## 2022-11-04 PROCEDURE — 43450 DILATE ESOPHAGUS 1/MULT PASS: CPT | Performed by: INTERNAL MEDICINE

## 2022-11-04 PROCEDURE — 88305 TISSUE EXAM BY PATHOLOGIST: CPT | Performed by: INTERNAL MEDICINE

## 2022-11-04 PROCEDURE — 43239 EGD BIOPSY SINGLE/MULTIPLE: CPT | Performed by: INTERNAL MEDICINE

## 2022-11-04 PROCEDURE — 88342 IMHCHEM/IMCYTCHM 1ST ANTB: CPT | Performed by: INTERNAL MEDICINE

## 2022-11-04 RX ORDER — PANTOPRAZOLE SODIUM 40 MG/1
80 TABLET, DELAYED RELEASE ORAL
Qty: 180 | Refills: 5 | Status: ACTIVE
Start: 2022-11-04

## 2022-12-27 ENCOUNTER — ON CAMPUS - OUTPATIENT (AMBULATORY)
Dept: URBAN - METROPOLITAN AREA HOSPITAL 2 | Facility: HOSPITAL | Age: 57
End: 2022-12-27
Payer: COMMERCIAL

## 2022-12-27 VITALS
SYSTOLIC BLOOD PRESSURE: 143 MMHG | RESPIRATION RATE: 12 BRPM | DIASTOLIC BLOOD PRESSURE: 78 MMHG | TEMPERATURE: 97.7 F | OXYGEN SATURATION: 99 % | DIASTOLIC BLOOD PRESSURE: 85 MMHG | HEIGHT: 64 IN | HEART RATE: 75 BPM | HEART RATE: 68 BPM | HEART RATE: 67 BPM | HEART RATE: 79 BPM | DIASTOLIC BLOOD PRESSURE: 76 MMHG | OXYGEN SATURATION: 97 % | RESPIRATION RATE: 16 BRPM | DIASTOLIC BLOOD PRESSURE: 88 MMHG | SYSTOLIC BLOOD PRESSURE: 119 MMHG | SYSTOLIC BLOOD PRESSURE: 126 MMHG | OXYGEN SATURATION: 93 % | SYSTOLIC BLOOD PRESSURE: 132 MMHG | WEIGHT: 217 LBS | SYSTOLIC BLOOD PRESSURE: 125 MMHG | RESPIRATION RATE: 14 BRPM | OXYGEN SATURATION: 96 % | HEART RATE: 84 BPM | HEART RATE: 76 BPM

## 2022-12-27 DIAGNOSIS — K20.80 OTHER ESOPHAGITIS WITHOUT BLEEDING: ICD-10-CM

## 2022-12-27 DIAGNOSIS — R13.10 DYSPHAGIA, UNSPECIFIED: ICD-10-CM

## 2022-12-27 DIAGNOSIS — K22.2 ESOPHAGEAL OBSTRUCTION: ICD-10-CM

## 2022-12-27 DIAGNOSIS — K21.9 GASTRO-ESOPHAGEAL REFLUX DISEASE WITHOUT ESOPHAGITIS: ICD-10-CM

## 2022-12-27 PROCEDURE — 43450 DILATE ESOPHAGUS 1/MULT PASS: CPT | Performed by: INTERNAL MEDICINE

## 2022-12-27 PROCEDURE — 43235 EGD DIAGNOSTIC BRUSH WASH: CPT | Performed by: INTERNAL MEDICINE

## 2022-12-27 RX ORDER — FAMOTIDINE 40 MG/1
40 TABLET, FILM COATED ORAL
Qty: 90 | Refills: 4 | Status: ACTIVE
Start: 2022-12-27

## 2023-05-08 ENCOUNTER — OFFICE (AMBULATORY)
Dept: URBAN - METROPOLITAN AREA CLINIC 64 | Facility: CLINIC | Age: 58
End: 2023-05-08

## 2023-05-08 VITALS
HEIGHT: 64 IN | DIASTOLIC BLOOD PRESSURE: 74 MMHG | WEIGHT: 217 LBS | SYSTOLIC BLOOD PRESSURE: 105 MMHG | HEART RATE: 76 BPM

## 2023-05-08 DIAGNOSIS — K22.2 ESOPHAGEAL OBSTRUCTION: ICD-10-CM

## 2023-05-08 DIAGNOSIS — K21.00 GASTRO-ESOPHAGEAL REFLUX DISEASE WITH ESOPHAGITIS, WITHOUT B: ICD-10-CM

## 2023-05-08 DIAGNOSIS — Z87.891 PERSONAL HISTORY OF NICOTINE DEPENDENCE: ICD-10-CM

## 2023-05-08 DIAGNOSIS — F10.10 ALCOHOL ABUSE, UNCOMPLICATED: ICD-10-CM

## 2023-05-08 DIAGNOSIS — Z90.49 ACQUIRED ABSENCE OF OTHER SPECIFIED PARTS OF DIGESTIVE TRACT: ICD-10-CM

## 2023-05-08 DIAGNOSIS — R13.10 DYSPHAGIA, UNSPECIFIED: ICD-10-CM

## 2023-05-08 DIAGNOSIS — R11.2 NAUSEA WITH VOMITING, UNSPECIFIED: ICD-10-CM

## 2023-05-08 PROCEDURE — 99214 OFFICE O/P EST MOD 30 MIN: CPT | Performed by: NURSE PRACTITIONER

## 2023-05-08 RX ORDER — ONDANSETRON HYDROCHLORIDE 4 MG/1
8 TABLET, FILM COATED ORAL
Qty: 60 | Refills: 1 | Status: ACTIVE
Start: 2023-05-08

## 2024-01-30 ENCOUNTER — TRANSCRIBE ORDERS (OUTPATIENT)
Dept: ADMINISTRATIVE | Facility: HOSPITAL | Age: 59
End: 2024-01-30
Payer: COMMERCIAL

## 2024-01-30 DIAGNOSIS — E78.5 HYPERLIPIDEMIA, UNSPECIFIED HYPERLIPIDEMIA TYPE: Primary | ICD-10-CM

## 2024-01-30 DIAGNOSIS — Z13.6 SCREENING FOR CARDIOVASCULAR CONDITION: ICD-10-CM

## 2024-02-21 ENCOUNTER — HOSPITAL ENCOUNTER (OUTPATIENT)
Dept: ULTRASOUND IMAGING | Facility: HOSPITAL | Age: 59
Discharge: HOME | End: 2024-02-21

## 2024-02-21 ENCOUNTER — HOSPITAL ENCOUNTER (OUTPATIENT)
Dept: CT IMAGING | Facility: HOSPITAL | Age: 59
Discharge: HOME | End: 2024-02-21

## 2024-02-21 DIAGNOSIS — Z13.6 SCREENING FOR CARDIOVASCULAR CONDITION: ICD-10-CM

## 2024-02-21 DIAGNOSIS — E78.5 HYPERLIPIDEMIA, UNSPECIFIED HYPERLIPIDEMIA TYPE: ICD-10-CM

## 2024-02-21 LAB
BH CV VAS SCREENING CAROTID CCA LEFT: 58.5 CM/SEC
BH CV VAS SCREENING CAROTID CCA RIGHT: 73.8 CM/SEC
BH CV VAS SCREENING CAROTID ICA LEFT: 63.9 CM/SEC
BH CV VAS SCREENING CAROTID ICA RIGHT: 45.3 CM/SEC
BH CV XLRA MEAS - MID AO DIAM: 1.8 CM
BH CV XLRA MEAS - PAD LEFT ABI PT: 1.2
BH CV XLRA MEAS - PAD LEFT ARM: 116 MMHG
BH CV XLRA MEAS - PAD LEFT LEG PT: 148 MMHG
BH CV XLRA MEAS - PAD RIGHT ABI PT: 1.3
BH CV XLRA MEAS - PAD RIGHT ARM: 119 MMHG
BH CV XLRA MEAS - PAD RIGHT LEG PT: 150 MMHG

## 2024-02-21 PROCEDURE — 93799 UNLISTED CV SVC/PROCEDURE: CPT

## 2024-02-21 PROCEDURE — 75571 CT HRT W/O DYE W/CA TEST: CPT

## 2025-03-07 ENCOUNTER — OFFICE (AMBULATORY)
Dept: URBAN - METROPOLITAN AREA CLINIC 64 | Facility: CLINIC | Age: 60
End: 2025-03-07
Payer: COMMERCIAL

## 2025-03-07 VITALS
DIASTOLIC BLOOD PRESSURE: 65 MMHG | HEIGHT: 64 IN | HEART RATE: 74 BPM | WEIGHT: 214 LBS | SYSTOLIC BLOOD PRESSURE: 100 MMHG

## 2025-03-07 DIAGNOSIS — Z12.11 ENCOUNTER FOR SCREENING FOR MALIGNANT NEOPLASM OF COLON: ICD-10-CM

## 2025-03-07 DIAGNOSIS — K21.9 GASTRO-ESOPHAGEAL REFLUX DISEASE WITHOUT ESOPHAGITIS: ICD-10-CM

## 2025-03-07 DIAGNOSIS — K22.2 ESOPHAGEAL OBSTRUCTION: ICD-10-CM

## 2025-03-07 PROCEDURE — 99213 OFFICE O/P EST LOW 20 MIN: CPT
